# Patient Record
Sex: MALE | Race: WHITE | Employment: FULL TIME | URBAN - METROPOLITAN AREA
[De-identification: names, ages, dates, MRNs, and addresses within clinical notes are randomized per-mention and may not be internally consistent; named-entity substitution may affect disease eponyms.]

---

## 2017-08-24 ENCOUNTER — TRANSFERRED RECORDS (OUTPATIENT)
Dept: HEALTH INFORMATION MANAGEMENT | Facility: CLINIC | Age: 47
End: 2017-08-24

## 2018-09-11 ENCOUNTER — OFFICE VISIT (OUTPATIENT)
Dept: URGENT CARE | Facility: URGENT CARE | Age: 48
End: 2018-09-11
Payer: COMMERCIAL

## 2018-09-11 VITALS
RESPIRATION RATE: 16 BRPM | HEIGHT: 69 IN | OXYGEN SATURATION: 98 % | SYSTOLIC BLOOD PRESSURE: 118 MMHG | WEIGHT: 194 LBS | DIASTOLIC BLOOD PRESSURE: 68 MMHG | BODY MASS INDEX: 28.73 KG/M2 | TEMPERATURE: 98.5 F | HEART RATE: 64 BPM

## 2018-09-11 DIAGNOSIS — K64.4 EXTERNAL HEMORRHOIDS: Primary | ICD-10-CM

## 2018-09-11 PROCEDURE — 99203 OFFICE O/P NEW LOW 30 MIN: CPT | Performed by: PHYSICIAN ASSISTANT

## 2018-09-11 ASSESSMENT — ENCOUNTER SYMPTOMS
FEVER: 0
DIARRHEA: 0
CONSTIPATION: 0
NAUSEA: 0
COUGH: 0
VOMITING: 0
CHILLS: 0

## 2018-09-11 NOTE — PROGRESS NOTES
"SUBJECTIVE:   Justice Person is a 47 year old male presenting with a chief complaint of   Chief Complaint   Patient presents with     Pain     in anal area, bump x 2-3 days, some pain, applied bacitracin       He is a new patient of [facility].    He is presenting to urgent care with a complaint of a lump he has noticed in the anal area 3 days ago. Slightly tender and uncomfortable. He applied some bacitracin over the area without any changes. He has never had anything like this before. Denies any history of constipation, or straining with bowel movement. But notes he lifts heavy weights. He denies any abdominal pain, fever, chills, nausea, vomiting, or diarrhea.      Review of Systems   Constitutional: Negative for chills and fever.   Respiratory: Negative for cough.    Gastrointestinal: Negative for constipation, diarrhea, nausea and vomiting.   Genitourinary:        Lump in anal area       History reviewed. No pertinent past medical history.  Family History   Problem Relation Age of Onset     Diabetes Maternal Grandmother      Cancer - colorectal No family hx of      Prostate Cancer No family hx of      Current Outpatient Prescriptions   Medication Sig Dispense Refill     ALLEGRA 180 MG PO TABS 1 tablet qd  MD appointment needed 90 Tab 0     hydrocortisone (ANUSOL-HC) 2.5 % cream Place rectally 3 times daily for 7 days 30 g 1     NASONEX 50 MCG/ACT NA SUSP 2 sprays each nostril Once daily for allergies 3 months 1     Social History   Substance Use Topics     Smoking status: Never Smoker     Smokeless tobacco: Never Used     Alcohol use No       OBJECTIVE  /68 (BP Location: Right arm, Patient Position: Chair, Cuff Size: Adult Regular)  Pulse 64  Temp 98.5  F (36.9  C) (Oral)  Resp 16  Ht 5' 9.25\" (1.759 m)  Wt 194 lb (88 kg)  SpO2 98%  BMI 28.44 kg/m2    Physical Exam   Constitutional: He appears well-developed and well-nourished. No distress.   HENT:   Head: Normocephalic and atraumatic.   Right Ear: " External ear normal.   Left Ear: External ear normal.   Eyes: Conjunctivae are normal.   Neck: Normal range of motion.   Pulmonary/Chest: Effort normal. No respiratory distress.   Genitourinary:   Genitourinary Comments: External hemorrhoid noted at 6'oclock. Mild tenderness to palpation. No erythema, no open wound. Normal rectal tone. Normal external genitalia.   Neurological: He is alert.   Skin: Skin is warm.   Psychiatric: He has a normal mood and affect.       Labs:  No results found for this or any previous visit (from the past 24 hour(s)).        ASSESSMENT:      ICD-10-CM    1. External hemorrhoids K64.4 hydrocortisone (ANUSOL-HC) 2.5 % cream        Medical Decision Making:    Differential Diagnosis:  Hemorrhoid, abscess, etc....    Serious Comorbid Conditions:  Adult:  None    PLAN:  External hemorrhoid: Anusol Rx. Avoid heavy weight lifting until symptoms improve. Patient educational information provided on hemorrhoids. Follow up if any worsening symptoms. He agrees.    Followup:    If not improving or if condition worsens, follow up with your Primary Care Provider    Patient Instructions     Hemorrhoids    Hemorrhoids are swollen and inflamed veins inside the rectum and near the anus. The rectum is the last several inches of the colon. The anus is the passage between the rectum and the outside of the body.  Causes  The veins can become swollen due to increased pressure in them. This is most often caused by:    Chronic constipation or diarrhea    Straining when having a bowel movement    Sitting too long on the toilet    A low-fiber diet    Pregnancy  Symptoms    Bleeding from the rectum (this may be noticeable after bowel movements)    Lump near the anus    Itching around the anus    Pain around the anus  There are different types of hemorrhoids. Depending on the type you have and the severity, you may be able to treat yourself at home. In some cases, a procedure may be the best treatment option. Your  healthcare provider can tell you more about this, if needed.  Home care  General care    To get relief from pain or itching, try:  ? Medicines. Your healthcare provider may recommend stool softeners, suppositories, or laxatives to help manage constipation. Use these exactly as directed.  ? Sitz baths. A sitz bath involves sitting in a few inches of warm bath water. Be careful not to make the water so hot that you burn yourself--test it before sitting in it. Soak for about 10 to 15 minutes a few times a day. This may help relieve pain.  ? Topical products. Your healthcare provider may prescribe or recommend creams, ointments, or pads that can be applied to the hemorrhoid. Use these exactly as directed.  Tips to help prevent hemorrhoids    Eat more fiber. Fiber adds bulk to stool and absorbs water as it moves through your colon. This makes stool softer and easier to pass.  ? Increase the fiber in your diet with more fiber-rich foods. These include fresh fruit, vegetables, and whole grains.  ? Take a fiber supplement or bulking agent, if advised by your healthcare provider. These include products such as psyllium or methylcellulose.    Drink more water. Your healthcare provider may direct you to drink plenty of water. This can help keep stool soft.    Be more active. Frequent exercise aids digestion and helps prevent constipation. It may also help make bowel movements more regular.    Don t strain during bowel movements. This can make hemorrhoids more likely. Also, don t sit on the toilet for long periods of time.  Follow-up care  Follow up with your healthcare provider as advised. If a culture or imaging tests were done, someone will let you know the results when they are ready. This may take a few days or longer. If your healthcare provider recommends a procedure for your hemorrhoids, these options can be discussed. Options may include surgery and outpatient office treatments.  When to seek medical advice  Call your  healthcare provider right away if any of these occur:    Increased bleeding from the rectum    Increased pain around the rectum or anus    Weakness or dizziness  Call 911  Call 911 if any of these occur:    Trouble breathing or swallowing    Fainting or loss of consciousness    Unusually fast heart rate    Vomiting blood    Large amounts of blood in stool or black, tarry stools  Date Last Reviewed: 9/1/2017 2000-2017 [employer]. [address] [place] [address]. All rights reserved. This information is not intended as a substitute for professional medical care. Always follow your healthcare professional's instructions.        Hemorrhoids    Hemorrhoids are swollen and inflamed veins inside the rectum and near the anus. The rectum is the last several inches of the colon. The anus is the passage between the rectum and the outside of the body.  Causes  The veins can become swollen due to increased pressure in them. This is most often caused by:    Chronic constipation or diarrhea    Straining when having a bowel movement    Sitting too long on the toilet    A low-fiber diet    Pregnancy  Symptoms    Bleeding from the rectum (this may be noticeable after bowel movements)    Lump near the anus    Itching around the anus    Pain around the anus  There are different types of hemorrhoids. Depending on the type you have and the severity, you may be able to treat yourself at home. In some cases, a procedure may be the best treatment option. Your healthcare provider can tell you more about this, if needed.  Home care  General care    To get relief from pain or itching, try:  ? Medicines. Your healthcare provider may recommend stool softeners, suppositories, or laxatives to help manage constipation. Use these exactly as directed.  ? Sitz baths. A sitz bath involves sitting in a few inches of warm bath water. Be careful not to make the water so hot that you burn yourself--test it before sitting in it.  Soak for about 10 to 15 minutes a few times a day. This may help relieve pain.  ? Topical products. Your healthcare provider may prescribe or recommend creams, ointments, or pads that can be applied to the hemorrhoid. Use these exactly as directed.  Tips to help prevent hemorrhoids    Eat more fiber. Fiber adds bulk to stool and absorbs water as it moves through your colon. This makes stool softer and easier to pass.  ? Increase the fiber in your diet with more fiber-rich foods. These include fresh fruit, vegetables, and whole grains.  ? Take a fiber supplement or bulking agent, if advised by your healthcare provider. These include products such as psyllium or methylcellulose.    Drink more water. Your healthcare provider may direct you to drink plenty of water. This can help keep stool soft.    Be more active. Frequent exercise aids digestion and helps prevent constipation. It may also help make bowel movements more regular.    Don t strain during bowel movements. This can make hemorrhoids more likely. Also, don t sit on the toilet for long periods of time.  Follow-up care  Follow up with your healthcare provider as advised. If a culture or imaging tests were done, someone will let you know the results when they are ready. This may take a few days or longer. If your healthcare provider recommends a procedure for your hemorrhoids, these options can be discussed. Options may include surgery and outpatient office treatments.  When to seek medical advice  Call your healthcare provider right away if any of these occur:    Increased bleeding from the rectum    Increased pain around the rectum or anus    Weakness or dizziness  Call 911  Call 911 if any of these occur:    Trouble breathing or swallowing    Fainting or loss of consciousness    Unusually fast heart rate    Vomiting blood    Large amounts of blood in stool or black, tarry stools  Date Last Reviewed: 9/1/2017 2000-2017 [employer] [address]  [place] [address]. All rights reserved. This information is not intended as a substitute for professional medical care. Always follow your healthcare professional's instructions.

## 2018-09-11 NOTE — PATIENT INSTRUCTIONS
Hemorrhoids    Hemorrhoids are swollen and inflamed veins inside the rectum and near the anus. The rectum is the last several inches of the colon. The anus is the passage between the rectum and the outside of the body.  Causes  The veins can become swollen due to increased pressure in them. This is most often caused by:    Chronic constipation or diarrhea    Straining when having a bowel movement    Sitting too long on the toilet    A low-fiber diet    Pregnancy  Symptoms    Bleeding from the rectum (this may be noticeable after bowel movements)    Lump near the anus    Itching around the anus    Pain around the anus  There are different types of hemorrhoids. Depending on the type you have and the severity, you may be able to treat yourself at home. In some cases, a procedure may be the best treatment option. Your healthcare provider can tell you more about this, if needed.  Home care  General care    To get relief from pain or itching, try:  ? Medicines. Your healthcare provider may recommend stool softeners, suppositories, or laxatives to help manage constipation. Use these exactly as directed.  ? Sitz baths. A sitz bath involves sitting in a few inches of warm bath water. Be careful not to make the water so hot that you burn yourself--test it before sitting in it. Soak for about 10 to 15 minutes a few times a day. This may help relieve pain.  ? Topical products. Your healthcare provider may prescribe or recommend creams, ointments, or pads that can be applied to the hemorrhoid. Use these exactly as directed.  Tips to help prevent hemorrhoids    Eat more fiber. Fiber adds bulk to stool and absorbs water as it moves through your colon. This makes stool softer and easier to pass.  ? Increase the fiber in your diet with more fiber-rich foods. These include fresh fruit, vegetables, and whole grains.  ? Take a fiber supplement or bulking agent, if advised by your healthcare provider. These include products such as  psyllium or methylcellulose.    Drink more water. Your healthcare provider may direct you to drink plenty of water. This can help keep stool soft.    Be more active. Frequent exercise aids digestion and helps prevent constipation. It may also help make bowel movements more regular.    Don t strain during bowel movements. This can make hemorrhoids more likely. Also, don t sit on the toilet for long periods of time.  Follow-up care  Follow up with your healthcare provider as advised. If a culture or imaging tests were done, someone will let you know the results when they are ready. This may take a few days or longer. If your healthcare provider recommends a procedure for your hemorrhoids, these options can be discussed. Options may include surgery and outpatient office treatments.  When to seek medical advice  Call your healthcare provider right away if any of these occur:    Increased bleeding from the rectum    Increased pain around the rectum or anus    Weakness or dizziness  Call 911  Call 911 if any of these occur:    Trouble breathing or swallowing    Fainting or loss of consciousness    Unusually fast heart rate    Vomiting blood    Large amounts of blood in stool or black, tarry stools  Date Last Reviewed: 9/1/2017 2000-2017 "[employer]. [address] [place] [address]. All rights reserved. This information is not intended as a substitute for professional medical care. Always follow your healthcare professional's instructions.        Hemorrhoids    Hemorrhoids are swollen and inflamed veins inside the rectum and near the anus. The rectum is the last several inches of the colon. The anus is the passage between the rectum and the outside of the body.  Causes  The veins can become swollen due to increased pressure in them. This is most often caused by:    Chronic constipation or diarrhea    Straining when having a bowel movement    Sitting too long on the toilet    A low-fiber  diet    Pregnancy  Symptoms    Bleeding from the rectum (this may be noticeable after bowel movements)    Lump near the anus    Itching around the anus    Pain around the anus  There are different types of hemorrhoids. Depending on the type you have and the severity, you may be able to treat yourself at home. In some cases, a procedure may be the best treatment option. Your healthcare provider can tell you more about this, if needed.  Home care  General care    To get relief from pain or itching, try:  ? Medicines. Your healthcare provider may recommend stool softeners, suppositories, or laxatives to help manage constipation. Use these exactly as directed.  ? Sitz baths. A sitz bath involves sitting in a few inches of warm bath water. Be careful not to make the water so hot that you burn yourself--test it before sitting in it. Soak for about 10 to 15 minutes a few times a day. This may help relieve pain.  ? Topical products. Your healthcare provider may prescribe or recommend creams, ointments, or pads that can be applied to the hemorrhoid. Use these exactly as directed.  Tips to help prevent hemorrhoids    Eat more fiber. Fiber adds bulk to stool and absorbs water as it moves through your colon. This makes stool softer and easier to pass.  ? Increase the fiber in your diet with more fiber-rich foods. These include fresh fruit, vegetables, and whole grains.  ? Take a fiber supplement or bulking agent, if advised by your healthcare provider. These include products such as psyllium or methylcellulose.    Drink more water. Your healthcare provider may direct you to drink plenty of water. This can help keep stool soft.    Be more active. Frequent exercise aids digestion and helps prevent constipation. It may also help make bowel movements more regular.    Don t strain during bowel movements. This can make hemorrhoids more likely. Also, don t sit on the toilet for long periods of time.  Follow-up care  Follow up with  your healthcare provider as advised. If a culture or imaging tests were done, someone will let you know the results when they are ready. This may take a few days or longer. If your healthcare provider recommends a procedure for your hemorrhoids, these options can be discussed. Options may include surgery and outpatient office treatments.  When to seek medical advice  Call your healthcare provider right away if any of these occur:    Increased bleeding from the rectum    Increased pain around the rectum or anus    Weakness or dizziness  Call 911  Call 911 if any of these occur:    Trouble breathing or swallowing    Fainting or loss of consciousness    Unusually fast heart rate    Vomiting blood    Large amounts of blood in stool or black, tarry stools  Date Last Reviewed: 9/1/2017 2000-2017 [employer]. [address] [place] [address], [place] [address]. All rights reserved. This information is not intended as a substitute for professional medical care. Always follow your healthcare professional's instructions.

## 2018-09-11 NOTE — MR AVS SNAPSHOT
"              After Visit Summary   9/11/2018    Justice Person    MRN: [id number]           Patient Information     Date Of Birth          1970        Visit Information        Provider Department      9/11/2018 3:05 PM Petra Jane PA-C [facility]        Today's Diagnoses     External hemorrhoids    -  1       Follow-ups after your visit        Who to contact     If you have questions or need follow up information about today's clinic visit or your schedule please contact [facility] directly at [phone].  Normal or non-critical lab and imaging results will be communicated to you by MyChart, letter or phone within 4 business days after the clinic has received the results. If you do not hear from us within 7 days, please contact the clinic through MyChart or phone. If you have a critical or abnormal lab result, we will notify you by phone as soon as possible.  Submit refill requests through [employer]) or call your pharmacy and they will forward the refill request to us. Please allow 3 business days for your refill to be completed.          Additional Information About Your Visit        Care EveryWhere ID     This is your Care EveryWhere ID. This could be used by other organizations to access your [facility] medical records  [id number]        Your Vitals Were     Pulse Temperature Respirations Height Pulse Oximetry BMI (Body Mass Index)    64 98.5  F (36.9  C) (Oral) 16 5' 9.25\" (1.759 m) 98% 28.44 kg/m2       Blood Pressure from Last 3 Encounters:   09/11/18 118/68   02/19/09 122/74   04/01/08 114/66    Weight from Last 3 Encounters:   09/11/18 194 lb (88 kg)   02/19/09 203 lb 12.8 oz (92.4 kg)   04/01/08 199 lb (90.3 kg)              Today, you had the following     No orders found for display         Today's Medication Changes          These changes are accurate as of 9/11/18  3:36 PM.  If you have any questions, ask your nurse or doctor.               Start " taking these medicines.        Dose/Directions    hydrocortisone 2.5 % cream   Commonly known as:  ANUSOL-HC   Used for:  External hemorrhoids   Started by:  Petra Jane PA-C        Place rectally 3 times daily for 7 days   Quantity:  30 g   Refills:  1            Where to get your medicines      These medications were sent to [facility] [address]  [place], [place] - [address]  [address], [place] [address]     Phone:  [phone]     hydrocortisone 2.5 % cream                Primary Care Provider    Provider Not In System                Equal Access to Services     [facility]: Hadii miki langford hadasho Soomaali, waaxda luqadaha, qaybta kaalmada ju, noe sweet . So [facility] [phone].    ATENCIÓN: Si habla español, tiene a orellana disposición servicios gratuitos de asistencia lingüística. [facility] [phone].    We comply with applicable federal civil rights laws and [place] laws. We do not discriminate on the basis of race, color, national origin, age, disability, sex, sexual orientation, or gender identity.            Thank you!     Thank you for choosing [facility]  for your care. Our goal is always to provide you with excellent care. Hearing back from our patients is one way we can continue to improve our services. Please take a few minutes to complete the written survey that you may receive in the mail after your visit with us. Thank you!             Your Updated Medication List - Protect others around you: Learn how to safely use, store and throw away your medicines at www.disposemymeds.org.          This list is accurate as of 9/11/18  3:36 PM.  Always use your most recent med list.                   Brand Name Dispense Instructions for use Diagnosis    ALLEGRA 180 MG tablet   Generic drug:  fexofenadine     90 Tab    1 tablet qd  MD appointment needed    Hay fever       hydrocortisone 2.5 % cream    ANUSOL-HC    30 g    Place  rectally 3 times daily for 7 days    External hemorrhoids       NASONEX 50 MCG/ACT spray   Generic drug:  mometasone     3 months    2 sprays each nostril Once daily for allergies    Hay fever

## 2018-09-17 ENCOUNTER — OFFICE VISIT (OUTPATIENT)
Dept: URGENT CARE | Facility: URGENT CARE | Age: 48
End: 2018-09-17
Payer: COMMERCIAL

## 2018-09-17 ENCOUNTER — HOSPITAL ENCOUNTER (EMERGENCY)
Facility: CLINIC | Age: 48
Discharge: HOME | End: 2018-09-17
Attending: NURSE PRACTITIONER | Admitting: NURSE PRACTITIONER
Payer: COMMERCIAL

## 2018-09-17 VITALS
DIASTOLIC BLOOD PRESSURE: 82 MMHG | TEMPERATURE: 99.1 F | SYSTOLIC BLOOD PRESSURE: 120 MMHG | HEART RATE: 75 BPM | OXYGEN SATURATION: 98 %

## 2018-09-17 VITALS
TEMPERATURE: 98.6 F | RESPIRATION RATE: 16 BRPM | DIASTOLIC BLOOD PRESSURE: 94 MMHG | OXYGEN SATURATION: 99 % | BODY MASS INDEX: 28.44 KG/M2 | HEART RATE: 80 BPM | WEIGHT: 194 LBS | SYSTOLIC BLOOD PRESSURE: 163 MMHG

## 2018-09-17 DIAGNOSIS — K65.1 ABSCESS OF MALE PELVIS (H): Primary | ICD-10-CM

## 2018-09-17 DIAGNOSIS — K60.30 ANAL FISTULA: ICD-10-CM

## 2018-09-17 DIAGNOSIS — L02.214 ABSCESS OF GROIN, LEFT: ICD-10-CM

## 2018-09-17 DIAGNOSIS — L73.9 FOLLICULITIS: ICD-10-CM

## 2018-09-17 DIAGNOSIS — L03.314 CELLULITIS OF GROIN, LEFT: ICD-10-CM

## 2018-09-17 PROCEDURE — 10060 I&D ABSCESS SIMPLE/SINGLE: CPT

## 2018-09-17 PROCEDURE — 99283 EMERGENCY DEPT VISIT LOW MDM: CPT | Mod: 25

## 2018-09-17 PROCEDURE — 99214 OFFICE O/P EST MOD 30 MIN: CPT | Performed by: FAMILY MEDICINE

## 2018-09-17 RX ORDER — LIDOCAINE HYDROCHLORIDE 10 MG/ML
INJECTION, SOLUTION INFILTRATION; PERINEURAL
Status: DISCONTINUED
Start: 2018-09-17 | End: 2018-09-17 | Stop reason: HOSPADM

## 2018-09-17 ASSESSMENT — ENCOUNTER SYMPTOMS
CHILLS: 0
NAUSEA: 0
FEVER: 0
VOMITING: 0

## 2018-09-17 NOTE — MR AVS SNAPSHOT
After Visit Summary   9/17/2018    Justice Person    MRN: [id number]           Patient Information     Date Of Birth          1970        Visit Information        Provider Department      9/17/2018 3:40 PM Cat Rob MD [facility]        Today's Diagnoses     Abscess of male pelvis (H)    -  1       Follow-ups after your visit        Who to contact     If you have questions or need follow up information about today's clinic visit or your schedule please contact [facility] directly at [phone].  Normal or non-critical lab and imaging results will be communicated to you by MyChart, letter or phone within 4 business days after the clinic has received the results. If you do not hear from us within 7 days, please contact the clinic through MyChart or phone. If you have a critical or abnormal lab result, we will notify you by phone as soon as possible.  Submit refill requests through [employer] or call your pharmacy and they will forward the refill request to us. Please allow 3 business days for your refill to be completed.          Additional Information About Your Visit        Care EveryWhere ID     This is your Care EveryWhere ID. This could be used by other organizations to access your [place] medical records  [id number]        Your Vitals Were     Pulse Temperature Pulse Oximetry             75 99.1  F (37.3  C) (Oral) 98%          Blood Pressure from Last 3 Encounters:   09/17/18 120/82   09/11/18 118/68   02/19/09 122/74    Weight from Last 3 Encounters:   09/11/18 194 lb (88 kg)   02/19/09 203 lb 12.8 oz (92.4 kg)   04/01/08 199 lb (90.3 kg)              Today, you had the following     No orders found for display       Primary Care Provider Fax #    Physician No Ref-Primary [phone]       No address on file        Equal Access to Services     ISELA WATERS : naveed Hernandez qaybta kaalmada adeegyada, waxay  juvencio hernandezjustin joyaan ah. So [facility] [phone].    ATENCIÓN: Si habla cholo, tiene a orellana disposición servicios gratuitos de asistencia lingüística. Jack al [phone].    We comply with applicable federal civil rights laws and [place] laws. We do not discriminate on the basis of race, color, national origin, age, disability, sex, sexual orientation, or gender identity.            Thank you!     Thank you for choosing [facility]  for your care. Our goal is always to provide you with excellent care. Hearing back from our patients is one way we can continue to improve our services. Please take a few minutes to complete the written survey that you may receive in the mail after your visit with us. Thank you!             Your Updated Medication List - Protect others around you: Learn how to safely use, store and throw away your medicines at www.disposemymeds.org.          This list is accurate as of 9/17/18  4:39 PM.  Always use your most recent med list.                   Brand Name Dispense Instructions for use Diagnosis    ALLEGRA 180 MG tablet   Generic drug:  fexofenadine     90 Tab    1 tablet qd  MD appointment needed    Hay fever       hydrocortisone 2.5 % cream    ANUSOL-HC    30 g    Place rectally 3 times daily for 7 days    External hemorrhoids       NASONEX 50 MCG/ACT spray   Generic drug:  mometasone     3 months    2 sprays each nostril Once daily for allergies    Hay fever

## 2018-09-17 NOTE — ED AVS SNAPSHOT
[facility] Emergency Department    [address]    [facility] [address]    Phone:  [phone]    Fax:  [phone]                                       Justice Person   MRN: [id number]    Department:  [facility] Emergency Department   Date of Visit:  9/17/2018           Patient Information     Date Of Birth          1970        Your diagnoses for this visit were:     Folliculitis     Abscess of groin, left     Cellulitis of groin, left     Anal fistula        You were seen by Reyna Alba APRN CNP and Geoff Jacobs MD.      Follow-up Information     Schedule an appointment as soon as possible for a visit with COLON & RECTAL SURGERY [facility].    Specialty:  Colon and Rectal Surgery    Contact information:    [address] [place]  [facility] [address] [phone]        Follow up with Your primary care physcian In 2 days.    Why:  For wound re-check        Follow up with [facility] Emergency Department.    Specialty:  EMERGENCY MEDICINE    Why:  As needed, If symptoms worsen    Contact information:    [address]  [facility] [address]  [phone]        Discharge Instructions         Call tomorrow to schedule a appointment with a colorectal surgeon.  Follow-up with your primary in 2 days to recheck your abscess and cellulitis.  Return to ED with increasing pain, increasing redness, fevers, vomiting or inability to tolerate antibiotics.          Discharge Instructions  Cellulitis    Cellulitis is an infection of the skin that occurs when bacteria enter the skin.   Symptoms are generally redness, swelling, warmth and pain.  Your infection appeared to be appropriate to treat at home with antibiotics.  However, sometimes your infection may be worse than it seemed at first, or may worsen with time. If you have new or worse symptoms, you may need to be seen again in the Emergency Department or by your  primary provider.    Generally, every Emergency Department visit should have a follow-up clinic visit with either a primary or a specialty clinic/provider. Please follow-up as instructed by your emergency provider today.    Return to the Emergency Department if:    The redness, pain, or swelling gets a lot worse.  If the red area was marked, return if it is red significantly beyond the marked area.    You are unable to get your antibiotics, or are vomiting (throwing up) these pills, or you cannot take them.    You are feeling more ill, weak or lightheaded.    You start to run a new fever (temperature >101 F).    Anything else about the infection worries or concerns you.  Treatment:    Start your antibiotics right away, and take them as prescribed. Be sure to finish the whole prescription, even if you are better.    Apply a heating pad, warm packs, or warm water soaks to the infected area for 15 minutes at a time, at least 3 times a day. Do not use a heating pad on your feet or legs if you have diabetes. Do not sleep with a heating pad on, since this can cause burns or skin injury.    Rest your injured area for at least 1-2 days. After that you may start using your extremity again as long as there is not too much pain.     Raise the injured area above the level of your heart as much as possible in the first 1-2 days.    Tylenol  (acetaminophen), Motrin  (ibuprofen), or Advil  (ibuprofen) may help may help reduce pain and fever and may help you feel more comfortable. Be sure to read and follow the package directions, and ask your provider if you have questions.    If you were given a prescription for medicine here today, be sure to read all of the information (including the package insert) that comes with your prescription.  This will include important information about the medicine, its side effects, and any warnings that you need to know about.  The pharmacist who fills the prescription can provide more information  and answer questions you may have about the medicine.  If you have questions or concerns that the pharmacist cannot address, please call or return to the Emergency Department.     Remember that you can always come back to the Emergency Department if you are not able to see your regular provider in the amount of time listed above, if you get any new symptoms, or if there is anything that worries you.    .    Folliculitis  Folliculitis is an inflammation of a hair follicle. A hair follicle is the little pocket where a hair grows out of the skin. Bacteria normally live on the skin. But sometimes bacteria can get trapped in a follicle and cause infection. This causes a bumpy rash. The area over the follicles is red and raised. It may itch or be painful. The bumps may have fluid (pus) inside. The pus may leak and then form crusts. Sores can spread to other areas of the body. Once it goes away, folliculitis can come back at any time. Severe cases may cause permanent hair loss and scarring.  Folliculitis can happen anywhere on the body where hair grows. It can be caused by rubbing from tight clothing. Ingrown hairs can cause it. Soaking in a hot tub or swimming pool that has bacteria in the water can cause it. It may also occur if a hair follicle is blocked by a bandage.  Sores often go away in a few days with no treatment. In some cases, medicine may be given. A small piece of skin or pus may be taken to find the type of bacteria causing the infection.  Home care  The healthcare provider may prescribe an antibiotic cream or ointment.  Oral antibiotics may also be prescribed. Or you may be told to use an over-the-counter antibiotic cream. Follow all instructions when using any of these medicines.  General care:    Apply warm, moist compresses to the sores for 20 minutes up to 3 times a day. You can make a compress by soaking a cloth in warm water. Squeeze out excess water.    Don t cut, poke, or squeeze the sores. This can  be painful and spread infection.    Don t scratch the affected area. Scratching can delay healing.    Don t shave the areas affected by folliculitis.    If the sores leak fluid, cover the area with a nonstick gauze bandage. Use as little tape as possible. Carefully discard all soiled bandages.    Dress in loose cotton clothing.    Change sheets and blankets if they are soiled by pus. Wash all clothes, towels, sheets, and cloth diapers in soap and hot water. Do not share clothes, towels, or sheets with other family members.    Do not soak the sores in bath water. This can spread infection. Instead, keep the area clean by gently washing sores with soap and warm water.    Wash your hands or use antibacterial gels often to prevent spreading the bacteria.  Follow-up care  Follow up with your healthcare provider, or as advised.  When to seek medical advice  Call your healthcare provider right away if any of these occur:    Fever of 100.4 F (38 C) or higher    Spreading of the rash    Rash does not get better with treatment    Redness or swelling that gets worse    Rash becomes more painful    Foul-smelling fluid leaking from the skin    Rash improves, but then comes back   Date Last Reviewed: 11/1/2016 2000-2017 [employer]. [address] [place] [address], [place] [address]. All rights reserved. This information is not intended as a substitute for professional medical care. Always follow your healthcare professional's instructions.          Abscess (Incision & Drainage)  An abscess is sometimes called a boil. It happens when bacteria get trapped under the skin and start to grow. Pus forms inside the abscess as the body responds to the bacteria. An abscess can happen with an insect bite, ingrown hair, blocked oil gland, pimple, cyst, or puncture wound.  Your healthcare provider has drained the pus from your abscess. If the abscess pocket was large, your healthcare provider may have put in gauze packing. Your  provider will need to remove it on your next visit. He or she may also replace it at that time. You may not need antibiotics to treat a simple abscess, unless the infection is spreading into the skin around the wound (cellulitis).  The wound will take about 1 to 2 weeks to heal, depending on the size of the abscess. Healthy tissue will grow from the bottom and sides of the opening until it seals over.  Home care  These tips can help your wound heal:    The wound may drain for the first 2 days. Cover the wound with a clean dry dressing. Change the dressing if it becomes soaked with blood or pus.    If a gauze packing was placed inside the abscess pocket, you may be told to remove it yourself. You may do this in the shower. Once the packing is removed, you should wash the area in the shower, or clean the area as directed by your provider. Continue to do this until the skin opening has closed. Make sure you wash your hands after changing the packing or cleaning the wound.    If you were prescribed antibiotics, take them as directed until they are all gone.    You may use acetaminophen or ibuprofen to control pain, unless another pain medicine was prescribed. If you have liver disease or ever had a stomach ulcer, talk with your doctor before using these medicines.  Follow-up care  Follow up with your healthcare provider, or as advised. If a gauze packing was put in your wound, it should be removed in 1 to 2 days. Check your wound every day for any signs that the infection is getting worse. The signs are listed below.  When to seek medical advice  Call your healthcare provider right away if any of these occur:    Increasing redness or swelling    Red streaks in the skin leading away from the wound    Increasing local pain or swelling    Continued pus draining from the wound 2 days after treatment    Fever of 100.4 F (38 C) or higher, or as directed by your healthcare provider    Boil returns when you are at home  Date  Last Reviewed: 9/1/2016 2000-2017 [employer]. [address] [place] [address], [place] [address]. All rights reserved. This information is not intended as a substitute for professional medical care. Always follow your healthcare professional's instructions.          Anal Fistula  The anal canal is the end portion of the intestinal tract. It includes the rectum and anus. Sometimes, an abnormal passage forms from the anal canal to the skin near the anus. This is called an anal fistula. Anal fistulas can also form from the anal canal to other organs, such as the vagina or urinary tract.  An anal fistula most often occurs due to an anal abscess or infection. It can also occur with certain conditions, such as Crohn s disease. Trauma to the anal canal and surgery can also lead to anal fistulas.  Symptoms of an anal fistula can include:    Pain in or near the rectum    Drainage, which may contain blood, pus, or both (the drainage may be constant or stop and start again)    Bleeding from the rectum    Urinary problems  If you have an anal abscess or infection along with a fistula, you may also notice redness, swelling, or soreness in or near the anus or rectum. You may have a fever as well.  If caused by Crohn s disease, an anal fistula may respond to medicines such as antibiotics and immunosuppressants. This may lead to complete closure of the fistula. But once treatment stops, there is a high chance that the fistula may form again.  Anal fistulas often require surgery if other treatments don t correct the problem. The type of surgery depends on the type of fistula. More than one surgery may be required.  Please discuss all forms of treatment with your healthcare provider.  Home care  As you recover from treatment, make sure to take any prescribed medicines as directed. Do not take any over-the-counter medicines without first talking to your healthcare provider.  You may also be advised to:    Soak in a warm bath  3 or 4 times a day.    Wear a pad over your anal area as directed.    Eat a diet high in fiber.    Drink plenty of fluids.    Use a stool softener or bulk laxative as needed.    Return to your normal routine only after being cleared by your healthcare provider.  Follow-up care  Follow up with your healthcare provider, or as advised.  When to seek medical advice  Call your healthcare provider right away if any of these occur:    Fever of 100.4 F (38 C) or higher, or as directed by your healthcare provider    Hard or painful stools or trouble controlling your bowel movements    Symptoms of anal fistula return    Increased pain, redness, swelling, or drainage in or near the anus or rectum    Pain in the belly that does not respond to treatment or that does not go away after a few hours    Swelling in the belly that does not go away after a few hours    Mucus, pus or blood in the stool (dark or bright red)    Vomiting that won t stop  Call 911  Call 911 if any of these occur:    Trouble breathing or swallowing    Fainting    Rapid heart rate    Large amounts of blood in stool  Resources  The resources below can help you learn more about anal fistulas. They may also help you find support if you have conditions such as Crohn s disease.    National [place] of Diabetes and Digestive and Kidney Diseases (NIDDK), www.niddk.nih.gov    Crohn s and Colitis Foundation of [place], www.ccfa.org  Date Last Reviewed: 6/22/2015 2000-2017 [employer]. [address] [place] [address]. All rights reserved. This information is not intended as a substitute for professional medical care. Always follow your healthcare professional's instructions.          Discharge References/Attachments     ANAL FISTULA (ENGLISH)      24 Hour Appointment Hotline       To make an appointment at any [facility], call [phone] ([phone]). If you don't have a family doctor or clinic, we will help you find one.  [facility] are conveniently located to serve the needs of you and your family.             Review of your medicines      START taking        Dose / Directions Last dose taken    amoxicillin-clavulanate 875-125 MG per tablet   Commonly known as:  AUGMENTIN   Dose:  1 tablet   Quantity:  14 tablet        Take 1 tablet by mouth 2 times daily for 7 days   Refills:  0          Our records show that you are taking the medicines listed below. If these are incorrect, please call your family doctor or clinic.        Dose / Directions Last dose taken    ALLEGRA 180 MG tablet   Quantity:  90 Tab   Generic drug:  fexofenadine        1 tablet qd  MD appointment needed   Refills:  0        hydrocortisone 2.5 % cream   Commonly known as:  ANUSOL-HC   Quantity:  30 g        Place rectally 3 times daily for 7 days   Refills:  1        NASONEX 50 MCG/ACT spray   Quantity:  3 months   Generic drug:  mometasone        2 sprays each nostril Once daily for allergies   Refills:  1                Prescriptions were sent or printed at these locations (1 Prescription)                   Other Prescriptions                Printed at Department/Unit printer (1 of 1)         amoxicillin-clavulanate (AUGMENTIN) 875-125 MG per tablet                Orders Needing Specimen Collection     None      Pending Results     No orders found from 9/15/2018 to 9/18/2018.            Pending Culture Results     No orders found from 9/15/2018 to 9/18/2018.            Pending Results Instructions     If you had any lab results that were not finalized at the time of your Discharge, you can call the ED Lab Result RN at [phone]. You will be contacted by this team for any positive Lab results or changes in treatment. The nurses are available 7 days a week from 10A to 6:30P.  You can leave a message 24 hours per day and they will return your call.        Test Results From Your Hospital Stay               Clinical Quality Measure: Blood Pressure Screening      Your blood pressure was checked while you were in the emergency department today. The last reading we obtained was  BP: (!) 163/94 . Please read the guidelines below about what these numbers mean and what you should do about them.  If your systolic blood pressure (the top number) is less than 120 and your diastolic blood pressure (the bottom number) is less than 80, then your blood pressure is normal. There is nothing more that you need to do about it.  If your systolic blood pressure (the top number) is 120-139 or your diastolic blood pressure (the bottom number) is 80-89, your blood pressure may be higher than it should be. You should have your blood pressure rechecked within a year by a primary care provider.  If your systolic blood pressure (the top number) is 140 or greater or your diastolic blood pressure (the bottom number) is 90 or greater, you may have high blood pressure. High blood pressure is treatable, but if left untreated over time it can put you at risk for heart attack, stroke, or kidney failure. You should have your blood pressure rechecked by a primary care provider within the next 4 weeks.  If your provider in the emergency department today gave you specific instructions to follow-up with your doctor or provider even sooner than that, you should follow that instruction and not wait for up to 4 weeks for your follow-up visit.        Thank you for choosing [facility]       Thank you for choosing [facility] for your care. Our goal is always to provide you with excellent care. Hearing back from our patients is one way we can continue to improve our services. Please take a few minutes to complete the written survey that you may receive in the mail after you visit with us. Thank you!        Care EveryWhere ID     This is your Care EveryWhere ID. This could be used by other organizations to access your [facility] medical records  [id number]        Equal Access to Services     ISELA WATERS AH: Kellen langford  beth Arvizu, naveed coleman, юлия dodd, noe cisneros. So [facility] [phone].    ATENCIÓN: Si habla español, tiene a orellana disposición servicios gratuitos de asistencia lingüística. Llame al [phone].    We comply with applicable federal civil rights laws and [place] laws. We do not discriminate on the basis of race, color, national origin, age, disability, sex, sexual orientation, or gender identity.            After Visit Summary       This is your record. Keep this with you and show to your community pharmacist(s) and doctor(s) at your next visit.

## 2018-09-17 NOTE — ED AVS SNAPSHOT
[facility] Emergency Department    [address]    [facility] [address]    Phone:  [phone]    Fax:  [phone]                                       Justice Person   MRN: [id number]    Department:  [facility] Emergency Department   Date of Visit:  9/17/2018           After Visit Summary Signature Page     I have received my discharge instructions, and my questions have been answered. I have discussed any challenges I see with this plan with the nurse or doctor.    ..........................................................................................................................................  Patient/Patient Representative Signature      ..........................................................................................................................................  Patient Representative Print Name and Relationship to Patient    ..................................................               ................................................  Date                                   Time    ..........................................................................................................................................  Reviewed by Signature/Title    ...................................................              ..............................................  Date                                               Time          22EPIC Rev 08/18

## 2018-09-17 NOTE — PROGRESS NOTES
SUBJECTIVE:  Chief Complaint   Patient presents with     Urgent Care     Pain     Lt groin dull pain. Had been seen at the clinic due to hemorroid, started having pain on the Lt groin area x2 days- constant dull ache     Justice Person is a 47 year old male  who presents with chief complaint of anal tenderness/pain, blood streaked stool and anal pain which began 3-4 days ago.  He complains of worsening of pain for 2 days with drainage by his anus which is moderate.    He thought he had hemorrhoids and has been applying a steroid cream, but symptoms have been worse,  Also the skin in the gluteal crease is irritated .  Pain improved with nothing.  He denies any abdominal pain, nausea or vomiting or fevers.  No history of melena, profuse bleeding.      2 days ago he started developing pain at the base of the penis with redness / swelling / some fluctuance, but no drainage      No past medical history on file.  Patient Active Problem List   Diagnosis     Upper back pain     CARDIOVASCULAR SCREENING; LDL GOAL LESS THAN 160       ALLERGIES:  No known drug allergies      Current Outpatient Prescriptions on File Prior to Visit:  ALLEGRA 180 MG PO TABS 1 tablet qd  MD appointment needed   hydrocortisone (ANUSOL-HC) 2.5 % cream Place rectally 3 times daily for 7 days   NASONEX 50 MCG/ACT NA SUSP 2 sprays each nostril Once daily for allergies     No current facility-administered medications on file prior to visit.     Social History   Substance Use Topics     Smoking status: Never Smoker     Smokeless tobacco: Never Used     Alcohol use No       Family History   Problem Relation Age of Onset     Diabetes Maternal Grandmother      Cancer - colorectal No family hx of      Prostate Cancer No family hx of          ROS:  CONSTITUTIONAL:NEGATIVE for fever, chills,    EYES: NEGATIVE for vision changes or irritation  ENT/MOUTH: NEGATIVE for ear, mouth and throat problems  RESP:NEGATIVE for significant cough or SOB  GI: NEGATIVE for  nausea, abdominal pain,     OBJECTIVE:  /82 (BP Location: Right arm, Patient Position: Chair, Cuff Size: Adult Large)  Pulse 75  Temp 99.1  F (37.3  C) (Oral)  SpO2 98%  Constitutional:  General appearance: mild distress.   .  Abdomen: normal bowel sound, soft,non-tender.    Rectal exam: no tenderness noted with palpation of the anus, non-inflamed  external hemorrhoids noted- not tender  About 2 cm below the anus in the gluteal crease he has a draining abcess-  A hole 6 x 6 cm with purulent drainage noted.   With pressure to the adjacent gluteus fluctuance is felt and pus is expressed from the abcess.    Groin--  At the base of the penis on the left side he has an area 4 x 4 cm swollen, erythematous, fluctuant           EYES: EOMI,   conjunctiva clear  HENT: External ears with no swelling or lesions   Nose and lips without  Swelling, ulcers, erythema or lesions  NECK: normal pain free ROM  RESP: no labored respirations, no tachypnea  EXTREMITIES:   Full ROM without expression of pain or limitation x 4 extremities  NEURO: Normal strength and tone, ambulation without difficulty,   normal speech and mentation  SKIN: no suspicious lesions or rashes  PSYCH: mentation and affect appears normal and patient appearance--appropriately groomed       ASSESSMENT:   Abscess of male pelvis (H)     Two abcesses are noted-  Anterior at the base of the penis  and inferior to the anus-  Concern is that he may have a more involved pelvic infection connecting the 2 abcesses-  Will go to [facility] for further imaging to evaluate the extension of these 2 pelvic abcesses    Called [facility] ER- accepted

## 2018-09-17 NOTE — ED PROVIDER NOTES
Emergency Department Attending Supervision Note  9/17/2018  5:49 PM      I evaluated this patient in conjunction with Reyna Alba PA-C      Briefly, the patient presented with abscess      On my exam, ***    Results:    ED course:    My impression is ***        Diagnosis  No diagnosis found.      Reyna Alba, APRBETTYE *           Scribe Disclosure:  I, Hema Chen, am serving as a scribe at 5:48 PM on 9/17/2018 to document services personally performed by Casie Ramirez MD based on my observations and the provider's statements to me.

## 2018-09-17 NOTE — DISCHARGE INSTRUCTIONS
Call tomorrow to schedule a appointment with a colorectal surgeon.  Follow-up with your primary in 2 days to recheck your abscess and cellulitis.  Return to ED with increasing pain, increasing redness, fevers, vomiting or inability to tolerate antibiotics.          Discharge Instructions  Cellulitis    Cellulitis is an infection of the skin that occurs when bacteria enter the skin.   Symptoms are generally redness, swelling, warmth and pain.  Your infection appeared to be appropriate to treat at home with antibiotics.  However, sometimes your infection may be worse than it seemed at first, or may worsen with time. If you have new or worse symptoms, you may need to be seen again in the Emergency Department or by your primary provider.    Generally, every Emergency Department visit should have a follow-up clinic visit with either a primary or a specialty clinic/provider. Please follow-up as instructed by your emergency provider today.    Return to the Emergency Department if:    The redness, pain, or swelling gets a lot worse.  If the red area was marked, return if it is red significantly beyond the marked area.    You are unable to get your antibiotics, or are vomiting (throwing up) these pills, or you cannot take them.    You are feeling more ill, weak or lightheaded.    You start to run a new fever (temperature >101 F).    Anything else about the infection worries or concerns you.  Treatment:    Start your antibiotics right away, and take them as prescribed. Be sure to finish the whole prescription, even if you are better.    Apply a heating pad, warm packs, or warm water soaks to the infected area for 15 minutes at a time, at least 3 times a day. Do not use a heating pad on your feet or legs if you have diabetes. Do not sleep with a heating pad on, since this can cause burns or skin injury.    Rest your injured area for at least 1-2 days. After that you may start using your extremity again as long as there is  not too much pain.     Raise the injured area above the level of your heart as much as possible in the first 1-2 days.    Tylenol  (acetaminophen), Motrin  (ibuprofen), or Advil  (ibuprofen) may help may help reduce pain and fever and may help you feel more comfortable. Be sure to read and follow the package directions, and ask your provider if you have questions.    If you were given a prescription for medicine here today, be sure to read all of the information (including the package insert) that comes with your prescription.  This will include important information about the medicine, its side effects, and any warnings that you need to know about.  The pharmacist who fills the prescription can provide more information and answer questions you may have about the medicine.  If you have questions or concerns that the pharmacist cannot address, please call or return to the Emergency Department.     Remember that you can always come back to the Emergency Department if you are not able to see your regular provider in the amount of time listed above, if you get any new symptoms, or if there is anything that worries you.    .    Folliculitis  Folliculitis is an inflammation of a hair follicle. A hair follicle is the little pocket where a hair grows out of the skin. Bacteria normally live on the skin. But sometimes bacteria can get trapped in a follicle and cause infection. This causes a bumpy rash. The area over the follicles is red and raised. It may itch or be painful. The bumps may have fluid (pus) inside. The pus may leak and then form crusts. Sores can spread to other areas of the body. Once it goes away, folliculitis can come back at any time. Severe cases may cause permanent hair loss and scarring.  Folliculitis can happen anywhere on the body where hair grows. It can be caused by rubbing from tight clothing. Ingrown hairs can cause it. Soaking in a hot tub or swimming pool that has bacteria in the water can cause  it. It may also occur if a hair follicle is blocked by a bandage.  Sores often go away in a few days with no treatment. In some cases, medicine may be given. A small piece of skin or pus may be taken to find the type of bacteria causing the infection.  Home care  The healthcare provider may prescribe an antibiotic cream or ointment.  Oral antibiotics may also be prescribed. Or you may be told to use an over-the-counter antibiotic cream. Follow all instructions when using any of these medicines.  General care:    Apply warm, moist compresses to the sores for 20 minutes up to 3 times a day. You can make a compress by soaking a cloth in warm water. Squeeze out excess water.    Don t cut, poke, or squeeze the sores. This can be painful and spread infection.    Don t scratch the affected area. Scratching can delay healing.    Don t shave the areas affected by folliculitis.    If the sores leak fluid, cover the area with a nonstick gauze bandage. Use as little tape as possible. Carefully discard all soiled bandages.    Dress in loose cotton clothing.    Change sheets and blankets if they are soiled by pus. Wash all clothes, towels, sheets, and cloth diapers in soap and hot water. Do not share clothes, towels, or sheets with other family members.    Do not soak the sores in bath water. This can spread infection. Instead, keep the area clean by gently washing sores with soap and warm water.    Wash your hands or use antibacterial gels often to prevent spreading the bacteria.  Follow-up care  Follow up with your healthcare provider, or as advised.  When to seek medical advice  Call your healthcare provider right away if any of these occur:    Fever of 100.4 F (38 C) or higher    Spreading of the rash    Rash does not get better with treatment    Redness or swelling that gets worse    Rash becomes more painful    Foul-smelling fluid leaking from the skin    Rash improves, but then comes back   Date Last Reviewed: 11/1/2016     4940-2485 [employer]. [address] [place] [address]. All rights reserved. This information is not intended as a substitute for professional medical care. Always follow your healthcare professional's instructions.          Abscess (Incision & Drainage)  An abscess is sometimes called a boil. It happens when bacteria get trapped under the skin and start to grow. Pus forms inside the abscess as the body responds to the bacteria. An abscess can happen with an insect bite, ingrown hair, blocked oil gland, pimple, cyst, or puncture wound.  Your healthcare provider has drained the pus from your abscess. If the abscess pocket was large, your healthcare provider may have put in gauze packing. Your provider will need to remove it on your next visit. He or she may also replace it at that time. You may not need antibiotics to treat a simple abscess, unless the infection is spreading into the skin around the wound (cellulitis).  The wound will take about 1 to 2 weeks to heal, depending on the size of the abscess. Healthy tissue will grow from the bottom and sides of the opening until it seals over.  Home care  These tips can help your wound heal:    The wound may drain for the first 2 days. Cover the wound with a clean dry dressing. Change the dressing if it becomes soaked with blood or pus.    If a gauze packing was placed inside the abscess pocket, you may be told to remove it yourself. You may do this in the shower. Once the packing is removed, you should wash the area in the shower, or clean the area as directed by your provider. Continue to do this until the skin opening has closed. Make sure you wash your hands after changing the packing or cleaning the wound.    If you were prescribed antibiotics, take them as directed until they are all gone.    You may use acetaminophen or ibuprofen to control pain, unless another pain medicine was prescribed. If you have liver disease or ever had a stomach  ulcer, talk with your doctor before using these medicines.  Follow-up care  Follow up with your healthcare provider, or as advised. If a gauze packing was put in your wound, it should be removed in 1 to 2 days. Check your wound every day for any signs that the infection is getting worse. The signs are listed below.  When to seek medical advice  Call your healthcare provider right away if any of these occur:    Increasing redness or swelling    Red streaks in the skin leading away from the wound    Increasing local pain or swelling    Continued pus draining from the wound 2 days after treatment    Fever of 100.4 F (38 C) or higher, or as directed by your healthcare provider    Boil returns when you are at home  Date Last Reviewed: 9/1/2016 2000-2017 [employer]. [address] [place] [address], [place] [address]. All rights reserved. This information is not intended as a substitute for professional medical care. Always follow your healthcare professional's instructions.          Anal Fistula  The anal canal is the end portion of the intestinal tract. It includes the rectum and anus. Sometimes, an abnormal passage forms from the anal canal to the skin near the anus. This is called an anal fistula. Anal fistulas can also form from the anal canal to other organs, such as the vagina or urinary tract.  An anal fistula most often occurs due to an anal abscess or infection. It can also occur with certain conditions, such as Crohn s disease. Trauma to the anal canal and surgery can also lead to anal fistulas.  Symptoms of an anal fistula can include:    Pain in or near the rectum    Drainage, which may contain blood, pus, or both (the drainage may be constant or stop and start again)    Bleeding from the rectum    Urinary problems  If you have an anal abscess or infection along with a fistula, you may also notice redness, swelling, or soreness in or near the anus or rectum. You may have a fever as well.  If caused by  Crohn s disease, an anal fistula may respond to medicines such as antibiotics and immunosuppressants. This may lead to complete closure of the fistula. But once treatment stops, there is a high chance that the fistula may form again.  Anal fistulas often require surgery if other treatments don t correct the problem. The type of surgery depends on the type of fistula. More than one surgery may be required.  Please discuss all forms of treatment with your healthcare provider.  Home care  As you recover from treatment, make sure to take any prescribed medicines as directed. Do not take any over-the-counter medicines without first talking to your healthcare provider.  You may also be advised to:    Soak in a warm bath 3 or 4 times a day.    Wear a pad over your anal area as directed.    Eat a diet high in fiber.    Drink plenty of fluids.    Use a stool softener or bulk laxative as needed.    Return to your normal routine only after being cleared by your healthcare provider.  Follow-up care  Follow up with your healthcare provider, or as advised.  When to seek medical advice  Call your healthcare provider right away if any of these occur:    Fever of 100.4 F (38 C) or higher, or as directed by your healthcare provider    Hard or painful stools or trouble controlling your bowel movements    Symptoms of anal fistula return    Increased pain, redness, swelling, or drainage in or near the anus or rectum    Pain in the belly that does not respond to treatment or that does not go away after a few hours    Swelling in the belly that does not go away after a few hours    Mucus, pus or blood in the stool (dark or bright red)    Vomiting that won t stop  Call 911  Call 911 if any of these occur:    Trouble breathing or swallowing    Fainting    Rapid heart rate    Large amounts of blood in stool  Resources  The resources below can help you learn more about anal fistulas. They may also help you find support if you have conditions  such as Crohn s disease.    National [place] of Diabetes and Digestive and Kidney Diseases (NIDDK), www.niddk.nih.gov    Crohn s and Colitis Foundation of [place], www.ccfa.org  Date Last Reviewed: 6/22/2015 2000-2017 [employer]. [address] [place] [address]. All rights reserved. This information is not intended as a substitute for professional medical care. Always follow your healthcare professional's instructions.

## 2018-09-18 ENCOUNTER — HOSPITAL ENCOUNTER (EMERGENCY)
Facility: CLINIC | Age: 48
Discharge: HOME | End: 2018-09-18
Attending: EMERGENCY MEDICINE | Admitting: EMERGENCY MEDICINE
Payer: COMMERCIAL

## 2018-09-18 VITALS — TEMPERATURE: 98.2 F | OXYGEN SATURATION: 99 % | DIASTOLIC BLOOD PRESSURE: 80 MMHG | SYSTOLIC BLOOD PRESSURE: 132 MMHG

## 2018-09-18 DIAGNOSIS — L03.90 CELLULITIS: ICD-10-CM

## 2018-09-18 PROCEDURE — 10060 I&D ABSCESS SIMPLE/SINGLE: CPT

## 2018-09-18 PROCEDURE — 99283 EMERGENCY DEPT VISIT LOW MDM: CPT | Mod: 25

## 2018-09-18 RX ORDER — LIDOCAINE HYDROCHLORIDE AND EPINEPHRINE 10; 10 MG/ML; UG/ML
INJECTION, SOLUTION INFILTRATION; PERINEURAL
Status: DISCONTINUED
Start: 2018-09-18 | End: 2018-09-18 | Stop reason: HOSPADM

## 2018-09-18 RX ORDER — HYDROCODONE BITARTRATE AND ACETAMINOPHEN 5; 325 MG/1; MG/1
1 TABLET ORAL EVERY 4 HOURS PRN
Qty: 18 TABLET | Refills: 0 | Status: SHIPPED | OUTPATIENT
Start: 2018-09-18 | End: 2018-10-03

## 2018-09-18 ASSESSMENT — ENCOUNTER SYMPTOMS
COLOR CHANGE: 1
WOUND: 1
FEVER: 0

## 2018-09-18 NOTE — ED AVS SNAPSHOT
[facility] Emergency Department    [address]    [facility] [address]    Phone:  [phone]    Fax:  [phone]                                       Justice Person   MRN: [id number]    Department:  [facility] Emergency Department   Date of Visit:  9/18/2018           After Visit Summary Signature Page     I have received my discharge instructions, and my questions have been answered. I have discussed any challenges I see with this plan with the nurse or doctor.    ..........................................................................................................................................  Patient/Patient Representative Signature      ..........................................................................................................................................  Patient Representative Print Name and Relationship to Patient    ..................................................               ................................................  Date                                   Time    ..........................................................................................................................................  Reviewed by Signature/Title    ...................................................              ..............................................  Date                                               Time          22EPIC Rev 08/18

## 2018-09-18 NOTE — DISCHARGE INSTRUCTIONS
Have Dr Mitchell check wound tomorrow at your appointment.  Keep dressing on until then and continue the antibiotics the ER doctor started you on yesterday.

## 2018-09-18 NOTE — ED PROVIDER NOTES
History     Chief Complaint:  Abscess    The history is provided by the patient.      Justice Person is a 47 year old male with a history of abscess and hemorrhoids who presents to the emergency department with his wife for evaluation of an abscess. Approximately 1 week ago the patient was seen in urgent care for evaluation of hemorrhoids that he has since been treating with hemorrhoid cream. 4 days ago, the patient reports the onset of a perineal abscess. Yesterday, the patient reports another abscess on his left groin prompting him to be seen in the ED where an I&D was done and the patient was discharged on Augmentin. Since his discharge the patient notes that the abscess has persisted and noted that it has significantly increased in size of redness, prompting the patient to seek further evaluation here in the ED. Here, the patient complains of the left inguinal abscess and notes that he has follow up for the perineal abscess with the colorectal surgeon, Dr. Mitchell, tomorrow. The patient denies any fevers.     Allergies:  NKDA    Medications:    Allegra   Hydrocortisone Cream  Nasonex  Augmentin    Past Medical History:    Chronic Back Pain  Abscess  Hemorrhoids    Past Surgical History:    The patient does not have any pertinent past surgical history.    Family History:    Diabetes    Social History:  Marital Status:   [2]  Tobacco Use: No  Alcohol Use: Yes    Review of Systems   Constitutional: Negative for fever.   Skin: Positive for color change (Redness) and wound (Abscess).   All other systems reviewed and are negative.      Physical Exam     Patient Vitals for the past 24 hrs:   BP Temp Temp src Heart Rate SpO2   09/18/18 1645 - - - - 99 %   09/18/18 1644 132/80 98.2  F (36.8  C) Oral 99 99 %       Physical Exam  General: Lying on the bed, comfortable appearing  HEENT:   The scalp and head appear normal    The pupils are equal, round, and reactive to light    Extraocular muscles are intact.    The  nose is normal.    The oropharynx is normal.      Uvula is in the midline.    Neck:  Normal range of motion.    Lungs:  Clear.      No rales, no wheezing.      There is no tachypnea.  Non-labored.  Cardiac: Regular rate.      Normal S1 and S2.      No pathological murmur/rub    Abdomen: Soft. No distension, no localized tenderness or rebound.  MS:  Normal tone. Normal movement of all extremities.   Neurologic:     Normal mentation.  No cranial nerve deficits.  No focal motor or sensory                              changes.      Speech normal.  Psych:  Awake.     Alert.      Normal affect.      Appropriate interactions.  Skin:  Egg sized area of induration and swelling in the left inguinal region with moderate erythema and tenderness that spreads out laterally approximately 10 cm.      Emergency Department Course     Procedures:    Procedure: Incision and Drainage     LOCATIONS:  Left Groin     ANESTHESIA:  Local field block using Lidocaine 1% with epinephrine, total of 10 mLs     PREPARATION:  Cleansed with Betadine     PROCEDURE:  Area was incised with # 11 Blade ([place]) with a Single Straight incision.  Wound treatment included Deloculation and Purulent Drainage.  Packing consisted of Iodoform Gauze.  Appropriate dressing was applied to cover the area.    Patient Status:  Patient tolerated the procedure well. There were no complications.    Emergency Department Course:  1645 Nursing notes and vitals reviewed. I performed an exam of the patient as documented above.     I performed an I&D of the abscess as per the above procedure note.    3956 I rechecked the patient and discussed the results of his workup thus far.     Findings and plan explained to the patient. Patient discharged home with instructions regarding supportive care, medications, and reasons to return. The importance of close follow-up was reviewed.    I personally answered all related questions prior to discharge.      Impression & Plan       Medical Decision Making:  Justice Person is a 47 year old male who presents for recheck of an abscess. He had it aspirated yesterday. He stated that the redness has spread quite a bit. It was highlighted today with a marker, apparently there wasn't much redness there at all. He has been taking his Augmentin. There is a large egg sized area of induration and swelling that was anesthetized and then incised with a #11 blade as per the procedure note. A small amount of pus was relieved and then it was packed with 4 cm of 0.25 inch iodoform gauze and then a dressing was placed.       He has an appointment tomorrow with Dr. Mitchell for a perirectal abscess procedure at which time she can re-inspect the cellulitis to make sure it is defervescing.       Diagnosis:    ICD-10-CM    1. Cellulitis L03.90        Disposition:  discharged to home    Discharge Medications:  Discharge Medication List as of 9/18/2018  5:57 PM      START taking these medications    Details   HYDROcodone-acetaminophen (NORCO) 5-325 MG per tablet Take 1 tablet by mouth every 4 hours as needed for pain, Disp-18 tablet, R-0, Local Print           Scribe Disclosure:  I, Gurdeep Zhang, am serving as a scribe on 9/18/2018 at 4:45 PM to personally document services performed by Kenny Peres MD based on my observations and the provider's statements to me.     Gurdeep Zhang  9/18/2018   [facility] EMERGENCY DEPARTMENT       Kenny Peres MD  09/19/18 0960

## 2018-09-18 NOTE — ED TRIAGE NOTES
Pt in ED yesterday with abcess to left groin and around rectum.  Both sites were drained yesterday.  Pt is concerned that abcess to left groin is getting worse.  States that the swelling and redness to left groin has worsened.  Taking ABX.

## 2018-10-01 ENCOUNTER — OFFICE VISIT (OUTPATIENT)
Dept: URGENT CARE | Facility: URGENT CARE | Age: 48
End: 2018-10-01
Payer: COMMERCIAL

## 2018-10-01 VITALS
OXYGEN SATURATION: 97 % | HEART RATE: 64 BPM | SYSTOLIC BLOOD PRESSURE: 120 MMHG | TEMPERATURE: 98 F | DIASTOLIC BLOOD PRESSURE: 82 MMHG

## 2018-10-01 DIAGNOSIS — L73.9 FOLLICULITIS: Primary | ICD-10-CM

## 2018-10-01 DIAGNOSIS — L03.115 CELLULITIS OF RIGHT THIGH: ICD-10-CM

## 2018-10-01 DIAGNOSIS — L03.012 CELLULITIS OF LEFT RING FINGER: ICD-10-CM

## 2018-10-01 PROCEDURE — 99213 OFFICE O/P EST LOW 20 MIN: CPT | Performed by: FAMILY MEDICINE

## 2018-10-01 RX ORDER — SULFAMETHOXAZOLE/TRIMETHOPRIM 800-160 MG
1 TABLET ORAL 2 TIMES DAILY
Qty: 20 TABLET | Refills: 0 | Status: ON HOLD | OUTPATIENT
Start: 2018-10-01 | End: 2019-05-26

## 2018-10-01 RX ORDER — MUPIROCIN 20 MG/G
OINTMENT TOPICAL 2 TIMES DAILY
Qty: 22 G | Refills: 3 | Status: ON HOLD | OUTPATIENT
Start: 2018-10-01 | End: 2018-10-06

## 2018-10-01 NOTE — PROGRESS NOTES
SUBJECTIVE:   Chief Complaint   Patient presents with     Urgent Care     Infection     Possible infected Lt ring finger x2 days- swollen, redness visible, pus discharge     Justice Person is a 47 year old male who presents for evaluation of and area of redness, tenderness, swelling and warmth of the skin that developed on the  left, dorsal 4th finger over the middle phalanyx   . Also has a large and small area over the right anterior thigh  Patient has had pain, purulent (pus) drainage , skin erythema (reddened skin) and tenderness for 4 days.    Precipitating event was unknown,   He was evaluated in ER 2 x with incision and drainage of abcess in left groin,  And he had a leander-rectal abcess rupture 2 days before.  He is following with [facility]-rectal surgery concerning the pelvic abcesses.    Therapies tried: local cleaning.    No past medical history on file.  Patient Active Problem List   Diagnosis     Upper back pain     CARDIOVASCULAR SCREENING; LDL GOAL LESS THAN 160       ALLERGIES:  No known drug allergies      Current Outpatient Prescriptions on File Prior to Visit:  ALLEGRA 180 MG PO TABS 1 tablet qd  MD appointment needed   HYDROcodone-acetaminophen (NORCO) 5-325 MG per tablet Take 1 tablet by mouth every 4 hours as needed for pain   NASONEX 50 MCG/ACT NA SUSP 2 sprays each nostril Once daily for allergies     No current facility-administered medications on file prior to visit.     Social History   Substance Use Topics     Smoking status: Never Smoker     Smokeless tobacco: Never Used     Alcohol use Yes       Family History   Problem Relation Age of Onset     Diabetes Maternal Grandmother      Cancer - colorectal No family hx of      Prostate Cancer No family hx of        ROS:  CONSTITUTIONAL:NEGATIVE for fever, chills,    EYES: NEGATIVE for vision changes or irritation  ENT/MOUTH: NEGATIVE for ear, mouth and throat problems  RESP:NEGATIVE for significant cough or SOB  GI: NEGATIVE for nausea, abdominal  pain,     OBJECTIVE:  /82 (BP Location: Right arm, Patient Position: Chair, Cuff Size: Adult Regular)  Pulse 64  Temp 98  F (36.7  C) (Oral)  SpO2 97%    SKIN:  Left 4th finger middle phalanyx dorsal area involved is 2 cm by 1.5 cm    The skin appear erythematous, moderately swollen, with tenderness and warmth to the touch.  Has scabbed over pustule in the center     Right anterior upper thigh  area involved is 12 cm by 6 cm  And a second spot 1 x 1 cm  .   The skin appear erythematous, moderately swollen, with tenderness and warmth to the touch.-  No fluctuance    GENERAL:  Alert, mild distress  EYES: EOMI,   conjunctiva clear  HENT: External ears with no swelling or lesions   Nose and lips without  Swelling, ulcers, erythema or lesions  NECK: normal pain free ROM  RESP: no labored respirations, no tachypnea  EXTREMITIES:   Full ROM without expression of pain or limitation x 4 extremities  NEURO: Normal strength and tone, ambulation without difficulty,   normal speech and mentation  PSYCH: mentation and affect appears normal and patient appearance--appropriately groomed       ASSESSMENT:  Folliculitis     - chlorhexidine (HIBICLENS) 4 % liquid; Apply topically daily as needed for wound care May use for bathing wounds    Cellulitis of left ring finger     - amoxicillin-clavulanate (AUGMENTIN) 875-125 MG per tablet; Take 1 tablet by mouth 2 times daily  - sulfamethoxazole-trimethoprim (BACTRIM DS/SEPTRA DS) 800-160 MG per tablet; Take 1 tablet by mouth 2 times daily  - mupirocin (BACTROBAN) 2 % ointment; Apply topically 2 times daily for 7 days  - chlorhexidine (HIBICLENS) 4 % liquid; Apply topically daily as needed for wound care May use for bathing wounds    Cellulitis of right thigh     - amoxicillin-clavulanate (AUGMENTIN) 875-125 MG per tablet; Take 1 tablet by mouth 2 times daily  - sulfamethoxazole-trimethoprim (BACTRIM DS/SEPTRA DS) 800-160 MG per tablet; Take 1 tablet by mouth 2 times daily  -  chlorhexidine (HIBICLENS) 4 % liquid; Apply topically daily as needed for wound care May use for bathing wounds       Wound culture  Ordered for future- if he has purulent pustule he should come to clinic for culture    patient to monitor for signs or symptoms of worsening/ spreading infection.  Go to Urgent care or Emergency Department if worsening fevers/chills and/or spreading infection.  Warm, moist packs or towels can be applied to the region to aid in resolution of the infection.  Use Tylenol or ibuprofen for pain or fever.  Discussed pro-biotics to protect his gut taking strong antibiotics    Continue follow-up with colo-rectal  Concerning pelvic abcesses

## 2018-10-01 NOTE — MR AVS SNAPSHOT
After Visit Summary   10/1/2018    Justice Person    MRN: [id number]           Patient Information     Date Of Birth          1970        Visit Information        Provider Department      10/1/2018 3:35 PM Cat Rob MD [facility] URGENT [facility]ARE        Today's Diagnoses     Folliculitis    -  1    Cellulitis of left ring finger        Cellulitis of right thigh          Care Instructions      Folliculitis  Folliculitis is an inflammation of a hair follicle. A hair follicle is the little pocket where a hair grows out of the skin. Bacteria normally live on the skin. But sometimes bacteria can get trapped in a follicle and cause infection. This causes a bumpy rash. The area over the follicles is red and raised. It may itch or be painful. The bumps may have fluid (pus) inside. The pus may leak and then form crusts. Sores can spread to other areas of the body. Once it goes away, folliculitis can come back at any time. Severe cases may cause permanent hair loss and scarring.  Folliculitis can happen anywhere on the body where hair grows. It can be caused by rubbing from tight clothing. Ingrown hairs can cause it. Soaking in a hot tub or swimming pool that has bacteria in the water can cause it. It may also occur if a hair follicle is blocked by a bandage.  Sores often go away in a few days with no treatment. In some cases, medicine may be given. A small piece of skin or pus may be taken to find the type of bacteria causing the infection.  Home care  The healthcare provider may prescribe an antibiotic cream or ointment.  Oral antibiotics may also be prescribed. Or you may be told to use an over-the-counter antibiotic cream. Follow all instructions when using any of these medicines.  General care:    Apply warm, moist compresses to the sores for 20 minutes up to 3 times a day. You can make a compress by soaking a cloth in warm water. Squeeze out excess water.    Don t cut, poke, or squeeze  the sores. This can be painful and spread infection.    Don t scratch the affected area. Scratching can delay healing.    Don t shave the areas affected by folliculitis.    If the sores leak fluid, cover the area with a nonstick gauze bandage. Use as little tape as possible. Carefully discard all soiled bandages.    Dress in loose cotton clothing.    Change sheets and blankets if they are soiled by pus. Wash all clothes, towels, sheets, and cloth diapers in soap and hot water. Do not share clothes, towels, or sheets with other family members.    Do not soak the sores in bath water. This can spread infection. Instead, keep the area clean by gently washing sores with soap and warm water.    Wash your hands or use antibacterial gels often to prevent spreading the bacteria.  Follow-up care  Follow up with your healthcare provider, or as advised.  When to seek medical advice  Call your healthcare provider right away if any of these occur:    Fever of 100.4 F (38 C) or higher    Spreading of the rash    Rash does not get better with treatment    Redness or swelling that gets worse    Rash becomes more painful    Foul-smelling fluid leaking from the skin    Rash improves, but then comes back   Date Last Reviewed: 11/1/2016 2000-2017 [employer]. [address] [place] [address], [place] [address]. All rights reserved. This information is not intended as a substitute for professional medical care. Always follow your healthcare professional's instructions.        Staph Infection (MRSA)  Staph is the short name for the common bacteria called staphylococcus aureus. Staph bacteria are often present on the skin without causing an infection. If it gets under the skin an infection occurs. This causes redness, tenderness, swelling and sometimes fluid drainage.  MRSA stands for methicillin-resistant staph aureus. Unlike a common staph infection, MRSA bacteria are resistant to the usual antibiotics and harder to treat. Also,  MRSA is more toxic than common staph bacteria. It can spread quickly throughout the body and cause a life-threatening illness.  MRSA is spread to others by direct physical contact with the bacteria. MRSA can also be transmitted from items contaminated by a person who has the bacteria, such as bandages, towels, bed sheets, or sports equipment. It is generally not spread through the air.  But you can acquire it if you come in direct contact with the fluid from someone's cough or sneeze.  Once you have a MRSA skin infection, you are at risk of having it again in the future.  If MRSA infection is suspected, the healthcare provider may take a wound culture to confirm the diagnosis. If an abscess is present, it may be drained. One or more antibiotics that work against MRSA will likely be prescribed.  Home care    Take any antibiotics prescribed exactly as directed. Do not stop taking them until they are gone or your healthcare provider tells you to stop, even if you feel better.    If antibiotic ointment was prescribed, use it as directed.    Wash your whole body (scalp to toes) daily for 5 days with prescription soap. Scrub fingernails with a brush for 1 minute twice a day with prescription soap.    Keep wounds covered with clean, dry bandages. Change dressings as they become soiled. Wash your hands well each time you change the bandage or touch the wound.    Remove any artificial nails and nail polish.  Treating household members  If you have been diagnosed with possible MRSA infection, those living with you are at higher risk of carrying the bacteria on their skin or in their nose, even if there is no sign of infection. Bacteria must be removed from the skin of all household members at the same time so it is not passed back and forth. Advise them to remove the bacteria as follows:    Household member should wash with prescription soap as outlined above.    If anyone in the household has a skin infection, it must be  treated by a healthcare provider. Washing alone will not treat a MRSA infection.    Clean counter tops and children's toys.    Do not share personal items such as toothbrush and razors. It is okay to share glasses, plates, and utensils.  Preventing spread of infection    Wash your hands frequently with plain soap and warm water. Be certain to clean under the fingernails, between the fingers, and the wrists. Dry hands with a single use towel (for example a paper towel). If soap and water are not available, you can use an alcohol-based hand . Rub the  over the entire surface of the hands, fingers, and wrists until dry.    Avoid sharing personal items such as towels, washcloths, razors, clothing, or uniforms. Wash soiled sheets, towels or clothes in hot water with laundry detergent. Use an automatic clothes dryer set on high to kill any remaining bacteria.    If you use a gym, wipe down equipment with an alcohol-based  before and after each use.  Wipe the handgrips as well.    If you participate in sports, shower with plain soap after every activity. Use a clean towel for each shower.  Follow-up care  Follow-up with your healthcare provider or as advised by our staff. If a wound culture was taken, call as directed for the results. You will be told about any changes to your treatment.  If you are diagnosed with MRSA, tell medical personnel in the future that you have been treated for this type of infection.  When to seek medical advice  Call your healthcare provider if any of the following occur:    Increasing redness, swelling or pain    Red streaks in the skin around the wound    Weakness or dizziness    New appearance of pus or drainage from the wound    New fever over 100.4  F (38.0  C), or as directed by the healthcare provider  Date Last Reviewed: 9/25/2015 2000-2017 [employer]. [address] [place] [address], [place] [address]. All rights reserved. This information is not  intended as a substitute for professional medical care. Always follow your healthcare professional's instructions.                Follow-ups after your visit        Your next 10 appointments already scheduled     Dec 10, 2018   Procedure with Teagan Mitchell MD   [facility] ([facility])    [address] [facility] [address]   [phone]           [facility] is located at [address] [facility]. [place]              Who to contact     If you have questions or need follow up information about today's clinic visit or your schedule please contact [facility] directly at [phone].  Normal or non-critical lab and imaging results will be communicated to you by MyChart, letter or phone within 4 business days after the clinic has received the results. If you do not hear from us within 7 days, please contact the clinic through MyChart or phone. If you have a critical or abnormal lab result, we will notify you by phone as soon as possible.  Submit refill requests through [employer] or call your pharmacy and they will forward the refill request to us. Please allow 3 business days for your refill to be completed.          Additional Information About Your Visit        Care EveryWhere ID     This is your Care EveryWhere ID. This could be used by other organizations to access your [facility] medical records  [id number]        Your Vitals Were     Pulse Temperature Pulse Oximetry             64 98  F (36.7  C) (Oral) 97%          Blood Pressure from Last 3 Encounters:   10/01/18 120/82   09/18/18 132/80   09/17/18 (!) 163/94    Weight from Last 3 Encounters:   09/17/18 194 lb (88 kg)   09/11/18 194 lb (88 kg)   02/19/09 203 lb 12.8 oz (92.4 kg)              Today, you had the following     No orders found for display         Today's Medication Changes          These changes are accurate as of 10/1/18  4:03 PM.  If you have any questions, ask your nurse  or doctor.               Start taking these medicines.        Dose/Directions    amoxicillin-clavulanate 875-125 MG per tablet   Commonly known as:  AUGMENTIN   Used for:  Cellulitis of right thigh, Cellulitis of left ring finger   Started by:  Cat Rob MD        Dose:  1 tablet   Take 1 tablet by mouth 2 times daily   Quantity:  20 tablet   Refills:  0       chlorhexidine 4 % liquid   Commonly known as:  HIBICLENS   Used for:  Cellulitis of right thigh, Cellulitis of left ring finger, Folliculitis   Started by:  Cat Rob MD        Apply topically daily as needed for wound care May use for bathing wounds   Quantity:  500 mL   Refills:  3       mupirocin 2 % ointment   Commonly known as:  BACTROBAN   Used for:  Cellulitis of left ring finger   Started by:  Cat Rob MD        Apply topically 2 times daily for 7 days   Quantity:  22 g   Refills:  3       sulfamethoxazole-trimethoprim 800-160 MG per tablet   Commonly known as:  BACTRIM DS/SEPTRA DS   Used for:  Cellulitis of right thigh, Cellulitis of left ring finger   Started by:  Cat Rob MD        Dose:  1 tablet   Take 1 tablet by mouth 2 times daily   Quantity:  20 tablet   Refills:  0            Where to get your medicines      These medications were sent to [facility] [address] [place] [address] [address] [facility]  [address] [address]     Phone:  [phone]     amoxicillin-clavulanate 875-125 MG per tablet    chlorhexidine 4 % liquid    mupirocin 2 % ointment    sulfamethoxazole-trimethoprim 800-160 MG per tablet                Primary Care Provider Fax #    Physician No Ref-Primary [phone]       No address on file        Equal Access to Services     [facility] : Kellen campos Somariah, waaxda luqadaha, qaybta kaalmada ju, noe cisneros. So [facility] [phone].    ATENCIÓN: Si habla español, tiene a orellana disposición servicios gratuitos de asistencia  lingüísticaRamila Santiago al [phone].    We comply with applicable federal civil rights laws and [place] laws. We do not discriminate on the basis of race, color, national origin, age, disability, sex, sexual orientation, or gender identity.            Thank you!     Thank you for choosing [facility]  for your care. Our goal is always to provide you with excellent care. Hearing back from our patients is one way we can continue to improve our services. Please take a few minutes to complete the written survey that you may receive in the mail after your visit with us. Thank you!             Your Updated Medication List - Protect others around you: Learn how to safely use, store and throw away your medicines at www.disposemymeds.org.          This list is accurate as of 10/1/18  4:03 PM.  Always use your most recent med list.                   Brand Name Dispense Instructions for use Diagnosis    ALLEGRA 180 MG tablet   Generic drug:  fexofenadine     90 Tab    1 tablet qd  MD appointment needed    Hay fever       amoxicillin-clavulanate 875-125 MG per tablet    AUGMENTIN    20 tablet    Take 1 tablet by mouth 2 times daily    Cellulitis of right thigh, Cellulitis of left ring finger       chlorhexidine 4 % liquid    HIBICLENS    500 mL    Apply topically daily as needed for wound care May use for bathing wounds    Cellulitis of right thigh, Cellulitis of left ring finger, Folliculitis       HYDROcodone-acetaminophen 5-325 MG per tablet    NORCO    18 tablet    Take 1 tablet by mouth every 4 hours as needed for pain        mupirocin 2 % ointment    BACTROBAN    22 g    Apply topically 2 times daily for 7 days    Cellulitis of left ring finger       NASONEX 50 MCG/ACT spray   Generic drug:  mometasone     3 months    2 sprays each nostril Once daily for allergies    Hay fever       sulfamethoxazole-trimethoprim 800-160 MG per tablet    BACTRIM DS/SEPTRA DS    20 tablet    Take 1 tablet by mouth 2  times daily    Cellulitis of right thigh, Cellulitis of left ring finger

## 2018-10-01 NOTE — PATIENT INSTRUCTIONS
Folliculitis  Folliculitis is an inflammation of a hair follicle. A hair follicle is the little pocket where a hair grows out of the skin. Bacteria normally live on the skin. But sometimes bacteria can get trapped in a follicle and cause infection. This causes a bumpy rash. The area over the follicles is red and raised. It may itch or be painful. The bumps may have fluid (pus) inside. The pus may leak and then form crusts. Sores can spread to other areas of the body. Once it goes away, folliculitis can come back at any time. Severe cases may cause permanent hair loss and scarring.  Folliculitis can happen anywhere on the body where hair grows. It can be caused by rubbing from tight clothing. Ingrown hairs can cause it. Soaking in a hot tub or swimming pool that has bacteria in the water can cause it. It may also occur if a hair follicle is blocked by a bandage.  Sores often go away in a few days with no treatment. In some cases, medicine may be given. A small piece of skin or pus may be taken to find the type of bacteria causing the infection.  Home care  The healthcare provider may prescribe an antibiotic cream or ointment.  Oral antibiotics may also be prescribed. Or you may be told to use an over-the-counter antibiotic cream. Follow all instructions when using any of these medicines.  General care:    Apply warm, moist compresses to the sores for 20 minutes up to 3 times a day. You can make a compress by soaking a cloth in warm water. Squeeze out excess water.    Don t cut, poke, or squeeze the sores. This can be painful and spread infection.    Don t scratch the affected area. Scratching can delay healing.    Don t shave the areas affected by folliculitis.    If the sores leak fluid, cover the area with a nonstick gauze bandage. Use as little tape as possible. Carefully discard all soiled bandages.    Dress in loose cotton clothing.    Change sheets and blankets if they are soiled by pus. Wash all clothes,  towels, sheets, and cloth diapers in soap and hot water. Do not share clothes, towels, or sheets with other family members.    Do not soak the sores in bath water. This can spread infection. Instead, keep the area clean by gently washing sores with soap and warm water.    Wash your hands or use antibacterial gels often to prevent spreading the bacteria.  Follow-up care  Follow up with your healthcare provider, or as advised.  When to seek medical advice  Call your healthcare provider right away if any of these occur:    Fever of 100.4 F (38 C) or higher    Spreading of the rash    Rash does not get better with treatment    Redness or swelling that gets worse    Rash becomes more painful    Foul-smelling fluid leaking from the skin    Rash improves, but then comes back   Date Last Reviewed: 11/1/2016 2000-2017 [employer]. [address] [place] [address]. All rights reserved. This information is not intended as a substitute for professional medical care. Always follow your healthcare professional's instructions.        Staph Infection (MRSA)  Staph is the short name for the common bacteria called staphylococcus aureus. Staph bacteria are often present on the skin without causing an infection. If it gets under the skin an infection occurs. This causes redness, tenderness, swelling and sometimes fluid drainage.  MRSA stands for methicillin-resistant staph aureus. Unlike a common staph infection, MRSA bacteria are resistant to the usual antibiotics and harder to treat. Also, MRSA is more toxic than common staph bacteria. It can spread quickly throughout the body and cause a life-threatening illness.  MRSA is spread to others by direct physical contact with the bacteria. MRSA can also be transmitted from items contaminated by a person who has the bacteria, such as bandages, towels, bed sheets, or sports equipment. It is generally not spread through the air.  But you can acquire it if you come  in direct contact with the fluid from someone's cough or sneeze.  Once you have a MRSA skin infection, you are at risk of having it again in the future.  If MRSA infection is suspected, the healthcare provider may take a wound culture to confirm the diagnosis. If an abscess is present, it may be drained. One or more antibiotics that work against MRSA will likely be prescribed.  Home care    Take any antibiotics prescribed exactly as directed. Do not stop taking them until they are gone or your healthcare provider tells you to stop, even if you feel better.    If antibiotic ointment was prescribed, use it as directed.    Wash your whole body (scalp to toes) daily for 5 days with prescription soap. Scrub fingernails with a brush for 1 minute twice a day with prescription soap.    Keep wounds covered with clean, dry bandages. Change dressings as they become soiled. Wash your hands well each time you change the bandage or touch the wound.    Remove any artificial nails and nail polish.  Treating household members  If you have been diagnosed with possible MRSA infection, those living with you are at higher risk of carrying the bacteria on their skin or in their nose, even if there is no sign of infection. Bacteria must be removed from the skin of all household members at the same time so it is not passed back and forth. Advise them to remove the bacteria as follows:    Household member should wash with prescription soap as outlined above.    If anyone in the household has a skin infection, it must be treated by a healthcare provider. Washing alone will not treat a MRSA infection.    Clean counter tops and children's toys.    Do not share personal items such as toothbrush and razors. It is okay to share glasses, plates, and utensils.  Preventing spread of infection    Wash your hands frequently with plain soap and warm water. Be certain to clean under the fingernails, between the fingers, and the wrists. Dry hands with a  single use towel (for example a paper towel). If soap and water are not available, you can use an alcohol-based hand . Rub the  over the entire surface of the hands, fingers, and wrists until dry.    Avoid sharing personal items such as towels, washcloths, razors, clothing, or uniforms. Wash soiled sheets, towels or clothes in hot water with laundry detergent. Use an automatic clothes dryer set on high to kill any remaining bacteria.    If you use a gym, wipe down equipment with an alcohol-based  before and after each use.  Wipe the handgrips as well.    If you participate in sports, shower with plain soap after every activity. Use a clean towel for each shower.  Follow-up care  Follow-up with your healthcare provider or as advised by our staff. If a wound culture was taken, call as directed for the results. You will be told about any changes to your treatment.  If you are diagnosed with MRSA, tell medical personnel in the future that you have been treated for this type of infection.  When to seek medical advice  Call your healthcare provider if any of the following occur:    Increasing redness, swelling or pain    Red streaks in the skin around the wound    Weakness or dizziness    New appearance of pus or drainage from the wound    New fever over 100.4  F (38.0  C), or as directed by the healthcare provider  Date Last Reviewed: 9/25/2015 2000-2017 [employer]. [address] [place] [address], [place] [id number]. All rights reserved. This information is not intended as a substitute for professional medical care. Always follow your healthcare professional's instructions.

## 2018-10-03 ENCOUNTER — APPOINTMENT (OUTPATIENT)
Dept: GENERAL RADIOLOGY | Facility: CLINIC | Age: 48
End: 2018-10-03
Attending: NURSE PRACTITIONER
Payer: COMMERCIAL

## 2018-10-03 ENCOUNTER — HOSPITAL ENCOUNTER (INPATIENT)
Facility: CLINIC | Age: 48
LOS: 3 days | Discharge: HOME | End: 2018-10-06
Attending: NURSE PRACTITIONER | Admitting: INTERNAL MEDICINE
Payer: COMMERCIAL

## 2018-10-03 DIAGNOSIS — L02.91 ABSCESS: ICD-10-CM

## 2018-10-03 DIAGNOSIS — L03.314 CELLULITIS OF GROIN, RIGHT: ICD-10-CM

## 2018-10-03 DIAGNOSIS — R79.89 ELEVATED SERUM CREATININE: ICD-10-CM

## 2018-10-03 DIAGNOSIS — L03.012 CELLULITIS OF FINGER OF LEFT HAND: ICD-10-CM

## 2018-10-03 DIAGNOSIS — A49.02 MRSA INFECTION: Primary | ICD-10-CM

## 2018-10-03 LAB
ANION GAP SERPL CALCULATED.3IONS-SCNC: 7 MMOL/L (ref 3–14)
BASOPHILS # BLD AUTO: 0 10E9/L (ref 0–0.2)
BASOPHILS NFR BLD AUTO: 0.4 %
BUN SERPL-MCNC: 16 MG/DL (ref 7–30)
CALCIUM SERPL-MCNC: 9.6 MG/DL (ref 8.5–10.1)
CHLORIDE SERPL-SCNC: 105 MMOL/L (ref 94–109)
CO2 SERPL-SCNC: 27 MMOL/L (ref 20–32)
CREAT SERPL-MCNC: 1.29 MG/DL (ref 0.66–1.25)
DIFFERENTIAL METHOD BLD: ABNORMAL
EOSINOPHIL # BLD AUTO: 0.1 10E9/L (ref 0–0.7)
EOSINOPHIL NFR BLD AUTO: 1 %
ERYTHROCYTE [DISTWIDTH] IN BLOOD BY AUTOMATED COUNT: 12.7 % (ref 10–15)
GFR SERPL CREATININE-BSD FRML MDRD: 59 ML/MIN/1.7M2
GLUCOSE SERPL-MCNC: 86 MG/DL (ref 70–99)
HCT VFR BLD AUTO: 49 % (ref 40–53)
HGB BLD-MCNC: 16.7 G/DL (ref 13.3–17.7)
IMM GRANULOCYTES # BLD: 0 10E9/L (ref 0–0.4)
IMM GRANULOCYTES NFR BLD: 0.4 %
LYMPHOCYTES # BLD AUTO: 1.1 10E9/L (ref 0.8–5.3)
LYMPHOCYTES NFR BLD AUTO: 9.8 %
MCH RBC QN AUTO: 30.4 PG (ref 26.5–33)
MCHC RBC AUTO-ENTMCNC: 34.1 G/DL (ref 31.5–36.5)
MCV RBC AUTO: 89 FL (ref 78–100)
MONOCYTES # BLD AUTO: 0.6 10E9/L (ref 0–1.3)
MONOCYTES NFR BLD AUTO: 5 %
NEUTROPHILS # BLD AUTO: 9.3 10E9/L (ref 1.6–8.3)
NEUTROPHILS NFR BLD AUTO: 83.4 %
NRBC # BLD AUTO: 0 10*3/UL
NRBC BLD AUTO-RTO: 0 /100
PLATELET # BLD AUTO: 333 10E9/L (ref 150–450)
POTASSIUM SERPL-SCNC: 4.1 MMOL/L (ref 3.4–5.3)
RBC # BLD AUTO: 5.5 10E12/L (ref 4.4–5.9)
SODIUM SERPL-SCNC: 139 MMOL/L (ref 133–144)
WBC # BLD AUTO: 11.1 10E9/L (ref 4–11)

## 2018-10-03 PROCEDURE — 10061 I&D ABSCESS COMP/MULTIPLE: CPT

## 2018-10-03 PROCEDURE — 87186 SC STD MICRODIL/AGAR DIL: CPT | Performed by: NURSE PRACTITIONER

## 2018-10-03 PROCEDURE — 87077 CULTURE AEROBIC IDENTIFY: CPT | Performed by: NURSE PRACTITIONER

## 2018-10-03 PROCEDURE — 85025 COMPLETE CBC W/AUTO DIFF WBC: CPT | Performed by: NURSE PRACTITIONER

## 2018-10-03 PROCEDURE — 80048 BASIC METABOLIC PNL TOTAL CA: CPT | Performed by: NURSE PRACTITIONER

## 2018-10-03 PROCEDURE — 96365 THER/PROPH/DIAG IV INF INIT: CPT

## 2018-10-03 PROCEDURE — 87641 MR-STAPH DNA AMP PROBE: CPT | Performed by: INTERNAL MEDICINE

## 2018-10-03 PROCEDURE — 87640 STAPH A DNA AMP PROBE: CPT | Performed by: INTERNAL MEDICINE

## 2018-10-03 PROCEDURE — 25000128 H RX IP 250 OP 636: Performed by: NURSE PRACTITIONER

## 2018-10-03 PROCEDURE — 0J9L3ZZ DRAINAGE OF RIGHT UPPER LEG SUBCUTANEOUS TISSUE AND FASCIA, PERCUTANEOUS APPROACH: ICD-10-PCS | Performed by: INTERNAL MEDICINE

## 2018-10-03 PROCEDURE — 99207 ZZC CDG-HISTORY COMP: MEETS EXP. PROBLEM FOCUSED-DOWN CODED LACK OF ROS: CPT | Performed by: INTERNAL MEDICINE

## 2018-10-03 PROCEDURE — 96366 THER/PROPH/DIAG IV INF ADDON: CPT

## 2018-10-03 PROCEDURE — 12000000 ZZH R&B MED SURG/OB

## 2018-10-03 PROCEDURE — 73140 X-RAY EXAM OF FINGER(S): CPT | Mod: LT

## 2018-10-03 PROCEDURE — 87070 CULTURE OTHR SPECIMN AEROBIC: CPT | Performed by: NURSE PRACTITIONER

## 2018-10-03 PROCEDURE — 99285 EMERGENCY DEPT VISIT HI MDM: CPT | Mod: 25

## 2018-10-03 PROCEDURE — 25000128 H RX IP 250 OP 636: Performed by: INTERNAL MEDICINE

## 2018-10-03 PROCEDURE — 25000132 ZZH RX MED GY IP 250 OP 250 PS 637: Performed by: INTERNAL MEDICINE

## 2018-10-03 PROCEDURE — 99233 SBSQ HOSP IP/OBS HIGH 50: CPT | Performed by: INTERNAL MEDICINE

## 2018-10-03 PROCEDURE — 96367 TX/PROPH/DG ADDL SEQ IV INF: CPT

## 2018-10-03 RX ORDER — DOXYCYCLINE 100 MG/10ML
100 INJECTION, POWDER, LYOPHILIZED, FOR SOLUTION INTRAVENOUS ONCE
Status: DISCONTINUED | OUTPATIENT
Start: 2018-10-03 | End: 2018-10-03

## 2018-10-03 RX ORDER — CEFAZOLIN SODIUM 1 G/50ML
1250 SOLUTION INTRAVENOUS ONCE
Status: COMPLETED | OUTPATIENT
Start: 2018-10-03 | End: 2018-10-03

## 2018-10-03 RX ORDER — TRAMADOL HYDROCHLORIDE 50 MG/1
50 TABLET ORAL EVERY 6 HOURS PRN
Status: DISCONTINUED | OUTPATIENT
Start: 2018-10-03 | End: 2018-10-06 | Stop reason: HOSPADM

## 2018-10-03 RX ORDER — ACETAMINOPHEN 325 MG/1
650 TABLET ORAL EVERY 6 HOURS PRN
Status: DISCONTINUED | OUTPATIENT
Start: 2018-10-03 | End: 2018-10-04

## 2018-10-03 RX ORDER — NALOXONE HYDROCHLORIDE 0.4 MG/ML
.1-.4 INJECTION, SOLUTION INTRAMUSCULAR; INTRAVENOUS; SUBCUTANEOUS
Status: DISCONTINUED | OUTPATIENT
Start: 2018-10-03 | End: 2018-10-04

## 2018-10-03 RX ADMIN — TRAMADOL HYDROCHLORIDE 50 MG: 50 TABLET, COATED ORAL at 18:15

## 2018-10-03 RX ADMIN — SODIUM CHLORIDE 1000 ML: 9 INJECTION, SOLUTION INTRAVENOUS at 18:11

## 2018-10-03 RX ADMIN — TAZOBACTAM SODIUM AND PIPERACILLIN SODIUM 3.38 G: 375; 3 INJECTION, SOLUTION INTRAVENOUS at 16:27

## 2018-10-03 RX ADMIN — TAZOBACTAM SODIUM AND PIPERACILLIN SODIUM 3.38 G: 375; 3 INJECTION, SOLUTION INTRAVENOUS at 22:09

## 2018-10-03 RX ADMIN — ACETAMINOPHEN 650 MG: 325 TABLET, FILM COATED ORAL at 19:28

## 2018-10-03 RX ADMIN — VANCOMYCIN HYDROCHLORIDE 1250 MG: 10 INJECTION, POWDER, LYOPHILIZED, FOR SOLUTION INTRAVENOUS at 17:00

## 2018-10-03 ASSESSMENT — ENCOUNTER SYMPTOMS
COLOR CHANGE: 1
WOUND: 1
FEVER: 0
VOMITING: 0

## 2018-10-03 ASSESSMENT — PAIN DESCRIPTION - DESCRIPTORS: DESCRIPTORS: THROBBING

## 2018-10-03 NOTE — ED TRIAGE NOTES
Pt has a red, swollen left ring finger for past 4 days.  Pt also one on his right inner thigh.  Pt has has 2 skin issues previously this month that he has had treatment for that are similar but different location.

## 2018-10-03 NOTE — PHARMACY-ADMISSION MEDICATION HISTORY
Admission medication history interview status for this patient is complete. See [facility] admission navigator for allergy information, prior to admission medications and immunization status.     Medication history interview source(s):Patient  Medication history resources (including written lists, pill bottles, clinic record):None    Changes made to PTA medication list:  Added: none  Deleted: allegra, nasonex, and norco  Changed: none    Actions taken by pharmacist (provider contacted, etc):None     Additional medication history information:None    Medication reconciliation/reorder completed by provider prior to medication history? No    For patients on insulin therapy: no (Yes/No)   Lantus/levemir/NPH/Mix 70/30 dose: ___ in AM/PM or twice daily   Sliding scale Novolog Y/N   If Yes, do you have a baseline novolog pre-meal dose: ______units with meals   Patients eat three meals a day: Y/N ---  How many episodes of hypoglycemia (low blood glucose) do you have weekly: ---   How many missed doses do you have a week: ---  How many times do you check your blood glucose per day: ---  Any Barriers to therapy: cost of medications/comfortable with giving injections (if applicable)/ comfortable and confident with current diabetes regimen ---      Prior to Admission medications    Medication Sig Last Dose Taking? Auth Provider   amoxicillin-clavulanate (AUGMENTIN) 875-125 MG per tablet Take 1 tablet by mouth 2 times daily 10/3/2018 at am Yes Cat Rob MD   chlorhexidine (HIBICLENS) 4 % liquid Apply topically daily as needed for wound care May use for bathing wounds 10/3/2018 at 1400 Yes Cat Rob MD   mupirocin (BACTROBAN) 2 % ointment Apply topically 2 times daily for 7 days 10/2/2018 at Unknown time Yes Cat Rob MD   sulfamethoxazole-trimethoprim (BACTRIM DS/SEPTRA DS) 800-160 MG per tablet Take 1 tablet by mouth 2 times daily 10/3/2018 at 0700 Yes Cat Rob MD

## 2018-10-03 NOTE — ED NOTES
"[facility]  ED Nurse Handoff Report    Justice Person is a 47 year old male   ED Chief complaint: Cellulitis  . ED Diagnosis:   Final diagnoses:   Cellulitis of finger of left hand   Cellulitis of groin, right     Allergies:   Allergies   Allergen Reactions     No Known Drug Allergies        Code Status: Full Code  Activity level - Baseline/Home:  Independent. Activity Level - Current:   Independent. Lift room needed: No. Bariatric: No   Needed: No   Isolation: No. Infection: Not Applicable.     Vital Signs:   Vitals:    10/03/18 1446   BP: 137/79   Pulse: 92   Resp: 20   Temp: 98.2  F (36.8  C)   TempSrc: Oral   SpO2: 94%   Weight: 88.5 kg (195 lb)   Height: 1.778 m (5' 10\")       Cardiac Rhythm:  ,      Pain level: 0-10 Pain Scale: 9  Patient confused: No. Patient Falls Risk: No.   Elimination Status: Has voided   Patient Report - Initial Complaint: cellulitis. Focused Assessment: Pt has known cellulitis to left ring finger. Pt states that he was seen at urgent care 2 days ago where he was put on 2 different antibiotics. Pt did not see any improvement after being on the antibiotics for 48 hrs. Pt is having increased redness, pain and swelling in the left ring finger.    Tests Performed: lab work, xray. Abnormal Results:   Labs Ordered and Resulted from Time of ED Arrival Up to the Time of Departure from the ED   BASIC METABOLIC PANEL - Abnormal; Notable for the following:        Result Value    Creatinine 1.29 (*)     GFR Estimate 59 (*)     All other components within normal limits   CBC WITH PLATELETS DIFFERENTIAL - Abnormal; Notable for the following:     WBC 11.1 (*)     Absolute Neutrophil 9.3 (*)     All other components within normal limits   PERIPHERAL IV CATHETER   .   Treatments provided: zosyn and vancomycin  Family Comments: Pt here by himself  OBS brochure/video discussed/provided to patient:  N/A  ED Medications:   Medications   piperacillin-tazobactam (ZOSYN) infusion 3.375 " g (3.375 g Intravenous New Bag 10/3/18 7029)   vancomycin (VANCOCIN) 1,250 mg in sodium chloride 0.9 % 250 mL intermittent infusion (not administered)     Drips infusing:  Yes  For the majority of the shift, the patient's behavior Green. Interventions performed were n/a.     Severe Sepsis OR Septic Shock Diagnosis Present: No      ED Nurse Name/Phone Number: Fabienne Walker,   4:41 PM  RECEIVING UNIT ED HANDOFF REVIEW    Above ED Nurse Handoff Report was reviewed: Yes  Reviewed by: Karen M. Lesch on October 3, 2018 at 6:44 PM

## 2018-10-03 NOTE — H&P
[facility]    History and Physical  Hospitalist       Date of Admission:  10/3/2018    Chief Complaint  : Left finger wound    History of Present Illness   47yoM with recent history of multiple SSTIs at different anatomical locations as follows.  He initally presented to the ED on 9/17 for evaluation of perianal fissure/abscess and noted incidentally to have left-sided suprapubic abscess which was incised at that time.  He returned to the ED on 9/18 for further incision and drainage of the suprapubic abscess as it had coalesced and become more indurated.  He then developed redness, tenderness, swelling and warmth of the left 4th finger on 10/1, and was started on Augmentin and Bactrim for cellulitis.  He presents today out of concern that this finger infection has progressed to abscess, also has another abscess on the right inner thigh and some satellite lesions on the abdomen and legs that appear to be folliculitis.  Both the left hand and right inner thigh abscesses were incised and and drained in the ED, only the thigh wound fluid was sent for culture. He denies any history of immunodeficiency or suppression.  Currently afebrile with mild leukocytosis.  Elevated creatinine noted incidentally.        Assessment & Plan     1) Left finger abscess:   - Continue Zosyn and Vancomycin initiated in the ED after incision  - Orthopedics consult to rule out tenosynovitis; XR unrevealing  - Tylenol and tramadol ordered for pain control  - Check MRSA swab  - WOCN consult, t/c outpatient dermatology eval?    2) Right thigh abscess:  - Continue with antibiotic regimen as above  - Incised and packed with iodine ribbon in ED  - Follow up wound culture    3) ANTONIA?:   - May be spurious Cr elevation given recent Bactrim use  - Give 1L NS bolus now and trend BMP        DVT Prophylaxis: Low Risk/Ambulatory with no VTE prophylaxis indicated and Pneumatic Compression Devices  Code Status: Full Code  Disposition Plan    Expected discharge: Tomorrow, recommended to prior living arrangement once antibiotic plan established.     Entered: Miguel Forrest MD 10/03/2018, 5:30 PM   Information in the above section will display in the discharge planner report.      Miguel Forrest MD    History is obtained from the patient    Primary Care Physician   Juju Victor    Past Medical History    I have reviewed this patient's medical history and updated it with pertinent information if needed.   Past Medical History:   Diagnosis Date     Abscess      Cellulitis        Past Surgical History   I have reviewed this patient's surgical history and updated it with pertinent information if needed.  History reviewed. No pertinent surgical history.    Prior to Admission Medications   Prior to Admission Medications   Prescriptions Last Dose Informant Patient Reported? Taking?   amoxicillin-clavulanate (AUGMENTIN) 875-125 MG per tablet 10/3/2018 at am  No Yes   Sig: Take 1 tablet by mouth 2 times daily   chlorhexidine (HIBICLENS) 4 % liquid 10/3/2018 at 1400  No Yes   Sig: Apply topically daily as needed for wound care May use for bathing wounds   mupirocin (BACTROBAN) 2 % ointment 10/2/2018 at Unknown time  No Yes   Sig: Apply topically 2 times daily for 7 days   sulfamethoxazole-trimethoprim (BACTRIM DS/SEPTRA DS) 800-160 MG per tablet 10/3/2018 at 0700  No Yes   Sig: Take 1 tablet by mouth 2 times daily      Facility-Administered Medications: None     Allergies   Allergies   Allergen Reactions     No Known Drug Allergies        Social History   I have reviewed this patient's social history and updated it with pertinent information if needed. Justice ALPHONSE Person  reports that he has never smoked. He has never used smokeless tobacco. He reports that he drinks alcohol. He reports that he does not use illicit drugs. Works as a teacher.      Family History   I have reviewed this patient's family history and updated it with pertinent information if needed.    Family History   Problem Relation Age of Onset     Diabetes Maternal Grandmother      Cancer - colorectal No family hx of      Prostate Cancer No family hx of        Review of Systems   The 10 point Review of Systems is negative other than noted in the HPI.    Physical Exam   Temp: 98.2  F (36.8  C) Temp src: Oral BP: 137/79 Pulse: 92   Resp: 20 SpO2: 94 % O2 Device: None (Room air)    Vital Signs with Ranges  Temp:  [98.2  F (36.8  C)] 98.2  F (36.8  C)  Pulse:  [92] 92  Resp:  [20] 20  BP: (137)/(79) 137/79  SpO2:  [94 %] 94 %  195 lbs 0 oz    Constitutional: NAD, AAox3  Respiratory: CTA, non-labored  Cardiovascular: Regular, no MRG  GI: Soft, NT/ND  Skin: Left 4th finger abscess and R thigh abscess both s/p I&D, several smaller areas of folliculitis on abdomen and legs  Musculoskeletal: Normal  Neurologic: CN grossly intact   Psychiatric: Normal affect    Data   Data reviewed today:  I personally reviewed the hand XR image(s) showing normal.    Recent Labs  Lab 10/03/18  1536   WBC 11.1*   HGB 16.7   MCV 89         POTASSIUM 4.1   CHLORIDE 105   CO2 27   BUN 16   CR 1.29*   ANIONGAP 7   SINGH 9.6   GLC 86       Recent Results (from the past 24 hour(s))   Fingers XR, 2-3 views, left    Narrative    LEFT FINGER TWO OR MORE VIEWS   10/3/2018 3:42 PM     HISTORY: Evaluate for infection.     COMPARISON: None.      Impression    IMPRESSION: No evidence of acute fracture or subluxation. No lytic or  destructive lesions. Prominent soft tissue swelling dorsal aspect of  fourth finger.    HENNA SOTO MD

## 2018-10-03 NOTE — ED PROVIDER NOTES
History     Chief Complaint:  Cellulitis    The history is provided by the patient.      Justice Person is a 47 year old male with a history of cellulitis who presents to the emergency department for evaluation of left finger cellulitis. Four days ago, the patient reports noticing a small red bump on her left ring finger. The top came off in the shower, scabbed over, and then proceeded to swell up and become red around the left ring finger, middle phalange joint. He was seen in urgent care two days ago and is on two different antibiotics (Augmentin and Bactrim) without improvement after 48 hours. This increasing pain in the left ring finger, pain now in his left finger knuckle, throbbing at night, increasing swelling, and redness prompted the patient to seek evaluation here in the emergency department.    Here, he continues to complain of the finger infection. He also reports he has two small red bumps on her right upper thigh/groin area, which seem to be in the initial stages of what he finger started off as. Of note, he has had several SSTI over the last few months which have required abscess drainage and antibiotic use.  He is being followed by colorectal surgery for possible abscess.  He denies fevers, chills, nausea, vomiting. Denies known history of MRSA.    Allergies:  NKDA     Medications:    Allegra  Bactrim  Nasonex  Bactroban  Norco  Hibiclens  Augmentin     Past Medical History:    Cellulitis    Past Surgical History:    The patient does not have any pertinent past surgical history.    Family History:    diabetes mellitus  Denies history of prostate cancer and colorectal cancer    Social History:  Negative for tobacco use.  Positive for alcohol use  Marital Status:        Review of Systems   Constitutional: Negative for fever.   Gastrointestinal: Negative for vomiting.   Skin: Positive for color change and wound.   All other systems reviewed and are negative.    Physical Exam     Patient Vitals  "for the past 24 hrs:   BP Temp Temp src Pulse Resp SpO2 Height Weight   10/03/18 1446 137/79 98.2  F (36.8  C) Oral 92 20 94 % 1.778 m (5' 10\") 88.5 kg (195 lb)       Physical Exam  Constitutional: Alert, attentive, GCS 15.    HENT: Mucous membranes are moist.   Eyes: EOM are normal. PERRLA. Conjunctiva pink, no scleral icterus or conjunctival injection  CV: regular rate and rhythm; no murmurs, rubs or gallups.  Radial, dorsalis pedis and posterior tibial pulses 2+ bilaterally.  Cap refill <2 seconds.  Respiratory: Effort normal. Lungs clear to auscultation bilaterally. No crackles/rubs/wheezes.  Good air movement.  GI:  There is no tenderness; rebound or guarding. No distension. Normal bowel sounds.  MSK: Left index finger: diffuse erythema, heat and edema over dorsum aspect of PIP and middle phalanx. Area of fluctuance over middle phalanx. No circumferential findings. Full ROM of finger without increased pain. Cap refill in fingertip <2 seconds Remainder of hand non-tender. Right groin: Area of fluctuance surrounded by erythema.  Erythema extends drastically beyond borders that were drawn on last night.    Neurological: Alert, attentive.  Skin: Skin is warm and dry.  No rashes or petechiae.  Psychiatric: Normal affect.      Emergency Department Course   Imaging:  Radiographic findings were communicated with the patient who voiced understanding of the findings.    XR Fingers, Left, 2-3 Views:  No evidence of acute fracture or subluxation. No lytic or  destructive lesions. Prominent soft tissue swelling dorsal aspect of  fourth finger. As per radiology.     Laboratory:  BMP: GFR 59 (L), Creatinine 1.29 (H), o/w WNL  CBC: WBC: 11.1 (H), HGB: 16.7, PLT: 333    Procedures:    Procedure: Incision and Drainage   LOCATIONS:  Left index finger     ANESTHESIA:  Digital field block using Lidocaine 1% plain, total of 3 mLs     PREPARATION:  Cleansed with Betadine     PROCEDURE:  Area was incised with # 11 Blade (Sharp Point) " with a Single Straight incision.  Wound treatment included Purulent Drainage and Expression of Material.  Packing consisted of No Packing.  Appropriate dressing was applied to cover the area.    Patient Status:        Patient tolerated the procedure well. There were no complications.        Procedure: Incision and Drainage   LOCATIONS:  Right groin     ANESTHESIA:  Local field block using Lidocaine 1% plain, total of 4 mLs     PREPARATION:  Cleansed with Betadine     PROCEDURE:  Area was incised with # 11 Blade (Sharp Point) with a Single Straight incision.  Wound treatment included Deloculation, Purulent Drainage and Expression of Material.  Packing consisted of Iodoform Gauze.  Appropriate dressing was applied to cover the area.    Patient Status:        Patient tolerated the procedure well. There were no complications    Interventions:  1627 Zosyn 3.375 g IV  1700 Vancomycin 1250 mg IV   1811 NS 1L IV  1815 Tramadol 50 mg PO    Emergency Department Course:  1500 Nursing notes and vitals reviewed.  I performed an exam of the patient as documented above.     IV inserted. Medicine administered as documented above. Blood drawn. This was sent to the lab for further testing, results above.    The patient was sent for a left fingers xray while in the emergency department, findings above.     1551 I consulted with JENNIFER Small, Orthopedics, regarding the patient's history and presentation here in the emergency department.    1645 I rechecked the patient and discussed the results of his workup thus far.     The patient had the infected areas drained while here in the emergency department, see procedure notes above.     1721  I consulted with Dr. Forrest of the hospitalist services. They are in agreement to accept the patient for admission.    1805 I spoke with Dr. Forrest here in the emergency department after he saw the patient at the bedside.    Findings and plan explained to the Patient who consents to admission.  Discussed the patient with Dr. Forrest, who will admit the patient to an observation bed for further monitoring, evaluation, and treatment.    Impression & Plan    Medical Decision Making:  Justice Person is a 47 year old male who presents for evaluation of multiple recent abscesses and cellulitis as outlined above.  At this point, he requires admission for IV antibiotics as he has failed outpatient management giving quickly expanding cellulitis after 48 hours of oral antibiotics.  Abscesses were drained as noted above and was sent for culture.  He was started on IV vancomycin and Zosyn.  Blood work revealed an elevated creatinine.  He was given a bolus of saline while in the ED.  Spoke with Ortho regarding abscess and cellulitis on finger.  At this point it does not appeared to be septic arthritis or infectious tenosynovitis.  However, Ortho will follow closely while hospitalized.  There appears to be no complications of cellulitis at this point including sepsis, septic shock, or necrotizing fasciitis.  All this was explained to patient who agrees with plan for observation admission  Spoke with Dr. Forrest who will admit patient for further workup and management.  Patient was staffed with MD Jacobs.      Critical Care time:  none    Diagnosis:    ICD-10-CM    1. Cellulitis of finger of left hand L03.012    2.  3  4 Cellulitis of groin, right  Elevated Creatinine  Abscess L03.314        Disposition:  Admitted to Dr. Forrest to an observation bed     Scribe Disclosure:   I, Whit Stanley, am serving as a scribe on 10/3/2018 at 2:57 PM to personally document services performed by Reyna Alba APRN based on my observations and the provider's statements to me.     Whit Stanley  10/3/2018   [facility] EMERGENCY DEPARTMENT       Reyna Alba APRN CNP  10/03/18 2145

## 2018-10-03 NOTE — IP AVS SNAPSHOT
[facility]    [address]    [facility] [address]    Phone:  [phone]    Fax:  [phone]                                       After Visit Summary   10/3/2018    Justice Person    MRN: [id number]           After Visit Summary Signature Page     I have received my discharge instructions, and my questions have been answered. I have discussed any challenges I see with this plan with the nurse or doctor.    ..........................................................................................................................................  Patient/Patient Representative Signature      ..........................................................................................................................................  Patient Representative Print Name and Relationship to Patient    ..................................................               ................................................  Date                                   Time    ..........................................................................................................................................  Reviewed by Signature/Title    ...................................................              ..............................................  Date                                               Time          22EPIC Rev 08/18

## 2018-10-03 NOTE — ED PROVIDER NOTES
Emergency Department Attending Supervision Note  10/3/2018  3:28 PM      I evaluated this patient in conjunction with PJ Hughes CNP    Briefly, the patient presented with abscess/cellulitis over left dorsal index finger PIP and right groin region.  He is failing outpatient antibiotics.    On my exam, there is redness and swelling and fluctuance over the areas mentioned above.  Still able to range the left index finger PIP without much issue.    My impression is cellulitis and abscess of the aforementioned areas.  I have a lower suspicion for septic arthritis of the left index finger PIP joint.  Reyna Alba NP performed bedside I&D of the 2 areas of abscess and administered broad-spectrum antibiotics.  Patient will be admitted to ensure improvement of the soft tissue infection and failure of outpatient antibiotics.  Reyna discussed the case with orthopedics who will see the patient as inpatient.    Diagnosis    ICD-10-CM    1. Cellulitis of finger of left hand L03.012    2. Cellulitis of groin, right L03.314        Geoff Jacobs MD Lindenbaum, Elan, MD  10/04/18 0109

## 2018-10-04 ENCOUNTER — ANESTHESIA EVENT (OUTPATIENT)
Dept: SURGERY | Facility: CLINIC | Age: 48
End: 2018-10-04
Payer: COMMERCIAL

## 2018-10-04 ENCOUNTER — ANESTHESIA (OUTPATIENT)
Dept: SURGERY | Facility: CLINIC | Age: 48
End: 2018-10-04
Payer: COMMERCIAL

## 2018-10-04 LAB
ANION GAP SERPL CALCULATED.3IONS-SCNC: 5 MMOL/L (ref 3–14)
BASOPHILS # BLD AUTO: 0 10E9/L (ref 0–0.2)
BASOPHILS NFR BLD AUTO: 0.5 %
BUN SERPL-MCNC: 14 MG/DL (ref 7–30)
CALCIUM SERPL-MCNC: 8.6 MG/DL (ref 8.5–10.1)
CHLORIDE SERPL-SCNC: 107 MMOL/L (ref 94–109)
CO2 SERPL-SCNC: 27 MMOL/L (ref 20–32)
CREAT SERPL-MCNC: 1.38 MG/DL (ref 0.66–1.25)
CREAT UR-MCNC: 34 MG/DL
DIFFERENTIAL METHOD BLD: NORMAL
EOSINOPHIL # BLD AUTO: 0.2 10E9/L (ref 0–0.7)
EOSINOPHIL NFR BLD AUTO: 2.7 %
ERYTHROCYTE [DISTWIDTH] IN BLOOD BY AUTOMATED COUNT: 12.8 % (ref 10–15)
FRACT EXCRET NA UR+SERPL-RTO: 0.9 %
GFR SERPL CREATININE-BSD FRML MDRD: 55 ML/MIN/1.7M2
GLUCOSE SERPL-MCNC: 88 MG/DL (ref 70–99)
HCT VFR BLD AUTO: 46.5 % (ref 40–53)
HGB BLD-MCNC: 15.6 G/DL (ref 13.3–17.7)
IMM GRANULOCYTES # BLD: 0 10E9/L (ref 0–0.4)
IMM GRANULOCYTES NFR BLD: 0.3 %
LYMPHOCYTES # BLD AUTO: 1 10E9/L (ref 0.8–5.3)
LYMPHOCYTES NFR BLD AUTO: 13.4 %
MAGNESIUM SERPL-MCNC: 2.2 MG/DL (ref 1.6–2.3)
MCH RBC QN AUTO: 30.2 PG (ref 26.5–33)
MCHC RBC AUTO-ENTMCNC: 33.5 G/DL (ref 31.5–36.5)
MCV RBC AUTO: 90 FL (ref 78–100)
MONOCYTES # BLD AUTO: 0.5 10E9/L (ref 0–1.3)
MONOCYTES NFR BLD AUTO: 6.7 %
MRSA DNA SPEC QL NAA+PROBE: NEGATIVE
NEUTROPHILS # BLD AUTO: 5.9 10E9/L (ref 1.6–8.3)
NEUTROPHILS NFR BLD AUTO: 76.4 %
NRBC # BLD AUTO: 0 10*3/UL
NRBC BLD AUTO-RTO: 0 /100
PLATELET # BLD AUTO: 305 10E9/L (ref 150–450)
POTASSIUM SERPL-SCNC: 4.2 MMOL/L (ref 3.4–5.3)
RBC # BLD AUTO: 5.17 10E12/L (ref 4.4–5.9)
SODIUM SERPL-SCNC: 139 MMOL/L (ref 133–144)
SODIUM UR-SCNC: 32 MMOL/L
SPECIMEN SOURCE: NORMAL
WBC # BLD AUTO: 7.8 10E9/L (ref 4–11)

## 2018-10-04 PROCEDURE — 25000128 H RX IP 250 OP 636: Performed by: ANESTHESIOLOGY

## 2018-10-04 PROCEDURE — 71000012 ZZH RECOVERY PHASE 1 LEVEL 1 FIRST HR: Performed by: ORTHOPAEDIC SURGERY

## 2018-10-04 PROCEDURE — 85025 COMPLETE CBC W/AUTO DIFF WBC: CPT | Performed by: INTERNAL MEDICINE

## 2018-10-04 PROCEDURE — 25000132 ZZH RX MED GY IP 250 OP 250 PS 637: Performed by: ORTHOPAEDIC SURGERY

## 2018-10-04 PROCEDURE — 87077 CULTURE AEROBIC IDENTIFY: CPT | Performed by: ORTHOPAEDIC SURGERY

## 2018-10-04 PROCEDURE — 36000052 ZZH SURGERY LEVEL 2 EA 15 ADDTL MIN: Performed by: ORTHOPAEDIC SURGERY

## 2018-10-04 PROCEDURE — 99232 SBSQ HOSP IP/OBS MODERATE 35: CPT | Performed by: INTERNAL MEDICINE

## 2018-10-04 PROCEDURE — 36415 COLL VENOUS BLD VENIPUNCTURE: CPT | Performed by: INTERNAL MEDICINE

## 2018-10-04 PROCEDURE — 82570 ASSAY OF URINE CREATININE: CPT | Performed by: INTERNAL MEDICINE

## 2018-10-04 PROCEDURE — 87070 CULTURE OTHR SPECIMN AEROBIC: CPT | Performed by: ORTHOPAEDIC SURGERY

## 2018-10-04 PROCEDURE — 37000008 ZZH ANESTHESIA TECHNICAL FEE, 1ST 30 MIN: Performed by: ORTHOPAEDIC SURGERY

## 2018-10-04 PROCEDURE — 25000128 H RX IP 250 OP 636: Performed by: INTERNAL MEDICINE

## 2018-10-04 PROCEDURE — 37000009 ZZH ANESTHESIA TECHNICAL FEE, EACH ADDTL 15 MIN: Performed by: ORTHOPAEDIC SURGERY

## 2018-10-04 PROCEDURE — 25000132 ZZH RX MED GY IP 250 OP 250 PS 637: Performed by: INTERNAL MEDICINE

## 2018-10-04 PROCEDURE — 87186 SC STD MICRODIL/AGAR DIL: CPT | Performed by: ORTHOPAEDIC SURGERY

## 2018-10-04 PROCEDURE — 40000306 ZZH STATISTIC PRE PROC ASSESS II: Performed by: ORTHOPAEDIC SURGERY

## 2018-10-04 PROCEDURE — 80048 BASIC METABOLIC PNL TOTAL CA: CPT | Performed by: INTERNAL MEDICINE

## 2018-10-04 PROCEDURE — 25000125 ZZHC RX 250: Performed by: NURSE ANESTHETIST, CERTIFIED REGISTERED

## 2018-10-04 PROCEDURE — 84300 ASSAY OF URINE SODIUM: CPT | Performed by: INTERNAL MEDICINE

## 2018-10-04 PROCEDURE — 27210794 ZZH OR GENERAL SUPPLY STERILE: Performed by: ORTHOPAEDIC SURGERY

## 2018-10-04 PROCEDURE — 0JBK0ZZ EXCISION OF LEFT HAND SUBCUTANEOUS TISSUE AND FASCIA, OPEN APPROACH: ICD-10-PCS | Performed by: ORTHOPAEDIC SURGERY

## 2018-10-04 PROCEDURE — 36000050 ZZH SURGERY LEVEL 2 1ST 30 MIN: Performed by: ORTHOPAEDIC SURGERY

## 2018-10-04 PROCEDURE — 87075 CULTR BACTERIA EXCEPT BLOOD: CPT | Performed by: ORTHOPAEDIC SURGERY

## 2018-10-04 PROCEDURE — 25000566 ZZH SEVOFLURANE, EA 15 MIN: Performed by: ORTHOPAEDIC SURGERY

## 2018-10-04 PROCEDURE — 25000128 H RX IP 250 OP 636: Performed by: NURSE ANESTHETIST, CERTIFIED REGISTERED

## 2018-10-04 PROCEDURE — 97602 WOUND(S) CARE NON-SELECTIVE: CPT

## 2018-10-04 PROCEDURE — 12000007 ZZH R&B INTERMEDIATE

## 2018-10-04 PROCEDURE — 99252 IP/OBS CONSLTJ NEW/EST SF 35: CPT | Performed by: SURGERY

## 2018-10-04 PROCEDURE — 25000128 H RX IP 250 OP 636: Performed by: ORTHOPAEDIC SURGERY

## 2018-10-04 PROCEDURE — 83735 ASSAY OF MAGNESIUM: CPT | Performed by: INTERNAL MEDICINE

## 2018-10-04 PROCEDURE — G0463 HOSPITAL OUTPT CLINIC VISIT: HCPCS | Mod: 25

## 2018-10-04 RX ORDER — DEXAMETHASONE SODIUM PHOSPHATE 4 MG/ML
4 INJECTION, SOLUTION INTRA-ARTICULAR; INTRALESIONAL; INTRAMUSCULAR; INTRAVENOUS; SOFT TISSUE
Status: DISCONTINUED | OUTPATIENT
Start: 2018-10-04 | End: 2018-10-04 | Stop reason: HOSPADM

## 2018-10-04 RX ORDER — OXYCODONE HYDROCHLORIDE 5 MG/1
5-10 TABLET ORAL
Status: DISCONTINUED | OUTPATIENT
Start: 2018-10-04 | End: 2018-10-06 | Stop reason: HOSPADM

## 2018-10-04 RX ORDER — HYDRALAZINE HYDROCHLORIDE 20 MG/ML
2.5-5 INJECTION INTRAMUSCULAR; INTRAVENOUS EVERY 10 MIN PRN
Status: DISCONTINUED | OUTPATIENT
Start: 2018-10-04 | End: 2018-10-04 | Stop reason: HOSPADM

## 2018-10-04 RX ORDER — IBUPROFEN 600 MG/1
600 TABLET, FILM COATED ORAL EVERY 6 HOURS PRN
Status: DISCONTINUED | OUTPATIENT
Start: 2018-10-04 | End: 2018-10-06 | Stop reason: HOSPADM

## 2018-10-04 RX ORDER — AMOXICILLIN 250 MG
2 CAPSULE ORAL 2 TIMES DAILY
Status: DISCONTINUED | OUTPATIENT
Start: 2018-10-04 | End: 2018-10-06 | Stop reason: HOSPADM

## 2018-10-04 RX ORDER — NALOXONE HYDROCHLORIDE 0.4 MG/ML
.1-.4 INJECTION, SOLUTION INTRAMUSCULAR; INTRAVENOUS; SUBCUTANEOUS
Status: DISCONTINUED | OUTPATIENT
Start: 2018-10-04 | End: 2018-10-04

## 2018-10-04 RX ORDER — AMOXICILLIN 250 MG
1 CAPSULE ORAL 2 TIMES DAILY
Status: DISCONTINUED | OUTPATIENT
Start: 2018-10-04 | End: 2018-10-06 | Stop reason: HOSPADM

## 2018-10-04 RX ORDER — KETOROLAC TROMETHAMINE 30 MG/ML
30 INJECTION, SOLUTION INTRAMUSCULAR; INTRAVENOUS
Status: DISCONTINUED | OUTPATIENT
Start: 2018-10-04 | End: 2018-10-04 | Stop reason: HOSPADM

## 2018-10-04 RX ORDER — FENTANYL CITRATE 50 UG/ML
INJECTION, SOLUTION INTRAMUSCULAR; INTRAVENOUS PRN
Status: DISCONTINUED | OUTPATIENT
Start: 2018-10-04 | End: 2018-10-04

## 2018-10-04 RX ORDER — PROPOFOL 10 MG/ML
INJECTION, EMULSION INTRAVENOUS PRN
Status: DISCONTINUED | OUTPATIENT
Start: 2018-10-04 | End: 2018-10-04

## 2018-10-04 RX ORDER — ONDANSETRON 2 MG/ML
INJECTION INTRAMUSCULAR; INTRAVENOUS PRN
Status: DISCONTINUED | OUTPATIENT
Start: 2018-10-04 | End: 2018-10-04

## 2018-10-04 RX ORDER — METOCLOPRAMIDE 10 MG/1
10 TABLET ORAL EVERY 6 HOURS PRN
Status: DISCONTINUED | OUTPATIENT
Start: 2018-10-04 | End: 2018-10-06 | Stop reason: HOSPADM

## 2018-10-04 RX ORDER — SODIUM CHLORIDE, SODIUM LACTATE, POTASSIUM CHLORIDE, CALCIUM CHLORIDE 600; 310; 30; 20 MG/100ML; MG/100ML; MG/100ML; MG/100ML
INJECTION, SOLUTION INTRAVENOUS CONTINUOUS
Status: DISCONTINUED | OUTPATIENT
Start: 2018-10-04 | End: 2018-10-04 | Stop reason: HOSPADM

## 2018-10-04 RX ORDER — PROCHLORPERAZINE MALEATE 5 MG
10 TABLET ORAL EVERY 6 HOURS PRN
Status: DISCONTINUED | OUTPATIENT
Start: 2018-10-04 | End: 2018-10-06 | Stop reason: HOSPADM

## 2018-10-04 RX ORDER — ONDANSETRON 2 MG/ML
4 INJECTION INTRAMUSCULAR; INTRAVENOUS EVERY 6 HOURS PRN
Status: DISCONTINUED | OUTPATIENT
Start: 2018-10-04 | End: 2018-10-06 | Stop reason: HOSPADM

## 2018-10-04 RX ORDER — LIDOCAINE 40 MG/G
CREAM TOPICAL
Status: DISCONTINUED | OUTPATIENT
Start: 2018-10-04 | End: 2018-10-06 | Stop reason: HOSPADM

## 2018-10-04 RX ORDER — HYDROMORPHONE HYDROCHLORIDE 1 MG/ML
.3-.5 INJECTION, SOLUTION INTRAMUSCULAR; INTRAVENOUS; SUBCUTANEOUS EVERY 5 MIN PRN
Status: DISCONTINUED | OUTPATIENT
Start: 2018-10-04 | End: 2018-10-04 | Stop reason: HOSPADM

## 2018-10-04 RX ORDER — SODIUM CHLORIDE, SODIUM LACTATE, POTASSIUM CHLORIDE, CALCIUM CHLORIDE 600; 310; 30; 20 MG/100ML; MG/100ML; MG/100ML; MG/100ML
INJECTION, SOLUTION INTRAVENOUS CONTINUOUS
Status: DISCONTINUED | OUTPATIENT
Start: 2018-10-04 | End: 2018-10-05

## 2018-10-04 RX ORDER — DEXAMETHASONE SODIUM PHOSPHATE 4 MG/ML
INJECTION, SOLUTION INTRA-ARTICULAR; INTRALESIONAL; INTRAMUSCULAR; INTRAVENOUS; SOFT TISSUE PRN
Status: DISCONTINUED | OUTPATIENT
Start: 2018-10-04 | End: 2018-10-04

## 2018-10-04 RX ORDER — GLYCOPYRROLATE 0.2 MG/ML
INJECTION, SOLUTION INTRAMUSCULAR; INTRAVENOUS PRN
Status: DISCONTINUED | OUTPATIENT
Start: 2018-10-04 | End: 2018-10-04

## 2018-10-04 RX ORDER — LIDOCAINE HYDROCHLORIDE 10 MG/ML
INJECTION, SOLUTION INFILTRATION; PERINEURAL PRN
Status: DISCONTINUED | OUTPATIENT
Start: 2018-10-04 | End: 2018-10-04

## 2018-10-04 RX ORDER — HYDROMORPHONE HYDROCHLORIDE 1 MG/ML
.3-.5 INJECTION, SOLUTION INTRAMUSCULAR; INTRAVENOUS; SUBCUTANEOUS
Status: DISCONTINUED | OUTPATIENT
Start: 2018-10-04 | End: 2018-10-06 | Stop reason: HOSPADM

## 2018-10-04 RX ORDER — SODIUM CHLORIDE 9 MG/ML
INJECTION, SOLUTION INTRAVENOUS CONTINUOUS
Status: DISCONTINUED | OUTPATIENT
Start: 2018-10-04 | End: 2018-10-04 | Stop reason: ALTCHOICE

## 2018-10-04 RX ORDER — LIDOCAINE 40 MG/G
CREAM TOPICAL
Status: DISCONTINUED | OUTPATIENT
Start: 2018-10-04 | End: 2018-10-04 | Stop reason: HOSPADM

## 2018-10-04 RX ORDER — ACETAMINOPHEN 325 MG/1
975 TABLET ORAL EVERY 8 HOURS
Status: DISCONTINUED | OUTPATIENT
Start: 2018-10-04 | End: 2018-10-06 | Stop reason: HOSPADM

## 2018-10-04 RX ORDER — CEFAZOLIN SODIUM 2 G/100ML
2 INJECTION, SOLUTION INTRAVENOUS
Status: DISCONTINUED | OUTPATIENT
Start: 2018-10-04 | End: 2018-10-04 | Stop reason: HOSPADM

## 2018-10-04 RX ORDER — HYDROXYZINE HYDROCHLORIDE 25 MG/1
25 TABLET, FILM COATED ORAL EVERY 6 HOURS PRN
Status: DISCONTINUED | OUTPATIENT
Start: 2018-10-04 | End: 2018-10-06 | Stop reason: HOSPADM

## 2018-10-04 RX ORDER — ONDANSETRON 2 MG/ML
4 INJECTION INTRAMUSCULAR; INTRAVENOUS EVERY 30 MIN PRN
Status: DISCONTINUED | OUTPATIENT
Start: 2018-10-04 | End: 2018-10-04 | Stop reason: HOSPADM

## 2018-10-04 RX ORDER — CEFAZOLIN SODIUM 1 G/3ML
1 INJECTION, POWDER, FOR SOLUTION INTRAMUSCULAR; INTRAVENOUS SEE ADMIN INSTRUCTIONS
Status: DISCONTINUED | OUTPATIENT
Start: 2018-10-04 | End: 2018-10-04 | Stop reason: HOSPADM

## 2018-10-04 RX ORDER — FENTANYL CITRATE 50 UG/ML
25-50 INJECTION, SOLUTION INTRAMUSCULAR; INTRAVENOUS
Status: DISCONTINUED | OUTPATIENT
Start: 2018-10-04 | End: 2018-10-04 | Stop reason: HOSPADM

## 2018-10-04 RX ORDER — ONDANSETRON 4 MG/1
4 TABLET, ORALLY DISINTEGRATING ORAL EVERY 6 HOURS PRN
Status: DISCONTINUED | OUTPATIENT
Start: 2018-10-04 | End: 2018-10-06 | Stop reason: HOSPADM

## 2018-10-04 RX ORDER — FENTANYL CITRATE 50 UG/ML
100 INJECTION, SOLUTION INTRAMUSCULAR; INTRAVENOUS ONCE
Status: COMPLETED | OUTPATIENT
Start: 2018-10-04 | End: 2018-10-04

## 2018-10-04 RX ORDER — DIMENHYDRINATE 50 MG/ML
25 INJECTION, SOLUTION INTRAMUSCULAR; INTRAVENOUS
Status: DISCONTINUED | OUTPATIENT
Start: 2018-10-04 | End: 2018-10-04 | Stop reason: HOSPADM

## 2018-10-04 RX ORDER — ONDANSETRON 4 MG/1
4 TABLET, ORALLY DISINTEGRATING ORAL EVERY 30 MIN PRN
Status: DISCONTINUED | OUTPATIENT
Start: 2018-10-04 | End: 2018-10-04 | Stop reason: HOSPADM

## 2018-10-04 RX ORDER — ACETAMINOPHEN 325 MG/1
650 TABLET ORAL EVERY 4 HOURS PRN
Status: DISCONTINUED | OUTPATIENT
Start: 2018-10-07 | End: 2018-10-06 | Stop reason: HOSPADM

## 2018-10-04 RX ORDER — METOCLOPRAMIDE HYDROCHLORIDE 5 MG/ML
10 INJECTION INTRAMUSCULAR; INTRAVENOUS EVERY 6 HOURS PRN
Status: DISCONTINUED | OUTPATIENT
Start: 2018-10-04 | End: 2018-10-06 | Stop reason: HOSPADM

## 2018-10-04 RX ORDER — NALOXONE HYDROCHLORIDE 0.4 MG/ML
.1-.4 INJECTION, SOLUTION INTRAMUSCULAR; INTRAVENOUS; SUBCUTANEOUS
Status: ACTIVE | OUTPATIENT
Start: 2018-10-04 | End: 2018-10-05

## 2018-10-04 RX ORDER — MEPERIDINE HYDROCHLORIDE 50 MG/ML
12.5 INJECTION INTRAMUSCULAR; INTRAVENOUS; SUBCUTANEOUS EVERY 5 MIN PRN
Status: DISCONTINUED | OUTPATIENT
Start: 2018-10-04 | End: 2018-10-04 | Stop reason: HOSPADM

## 2018-10-04 RX ORDER — LABETALOL HYDROCHLORIDE 5 MG/ML
10 INJECTION, SOLUTION INTRAVENOUS
Status: DISCONTINUED | OUTPATIENT
Start: 2018-10-04 | End: 2018-10-04 | Stop reason: HOSPADM

## 2018-10-04 RX ORDER — BUPIVACAINE HYDROCHLORIDE 5 MG/ML
INJECTION, SOLUTION PERINEURAL PRN
Status: DISCONTINUED | OUTPATIENT
Start: 2018-10-04 | End: 2018-10-04 | Stop reason: HOSPADM

## 2018-10-04 RX ADMIN — SENNOSIDES AND DOCUSATE SODIUM 1 TABLET: 8.6; 5 TABLET ORAL at 22:09

## 2018-10-04 RX ADMIN — DEXAMETHASONE SODIUM PHOSPHATE 4 MG: 4 INJECTION, SOLUTION INTRA-ARTICULAR; INTRALESIONAL; INTRAMUSCULAR; INTRAVENOUS; SOFT TISSUE at 17:02

## 2018-10-04 RX ADMIN — TRAMADOL HYDROCHLORIDE 50 MG: 50 TABLET, COATED ORAL at 00:52

## 2018-10-04 RX ADMIN — ACETAMINOPHEN 975 MG: 325 TABLET, FILM COATED ORAL at 22:08

## 2018-10-04 RX ADMIN — MIDAZOLAM 2 MG: 1 INJECTION INTRAMUSCULAR; INTRAVENOUS at 16:53

## 2018-10-04 RX ADMIN — PROPOFOL 200 MG: 10 INJECTION, EMULSION INTRAVENOUS at 17:02

## 2018-10-04 RX ADMIN — SODIUM CHLORIDE, POTASSIUM CHLORIDE, SODIUM LACTATE AND CALCIUM CHLORIDE: 600; 310; 30; 20 INJECTION, SOLUTION INTRAVENOUS at 21:45

## 2018-10-04 RX ADMIN — Medication: at 12:30

## 2018-10-04 RX ADMIN — VANCOMYCIN HYDROCHLORIDE 1500 MG: 10 INJECTION, POWDER, LYOPHILIZED, FOR SOLUTION INTRAVENOUS at 14:44

## 2018-10-04 RX ADMIN — ACETAMINOPHEN 650 MG: 325 TABLET, FILM COATED ORAL at 05:07

## 2018-10-04 RX ADMIN — FENTANYL CITRATE 100 MCG: 50 INJECTION INTRAMUSCULAR; INTRAVENOUS at 15:32

## 2018-10-04 RX ADMIN — TAZOBACTAM SODIUM AND PIPERACILLIN SODIUM 3.38 G: 375; 3 INJECTION, SOLUTION INTRAVENOUS at 22:08

## 2018-10-04 RX ADMIN — SODIUM CHLORIDE, POTASSIUM CHLORIDE, SODIUM LACTATE AND CALCIUM CHLORIDE: 600; 310; 30; 20 INJECTION, SOLUTION INTRAVENOUS at 16:53

## 2018-10-04 RX ADMIN — TAZOBACTAM SODIUM AND PIPERACILLIN SODIUM 3.38 G: 375; 3 INJECTION, SOLUTION INTRAVENOUS at 04:23

## 2018-10-04 RX ADMIN — ONDANSETRON 4 MG: 2 INJECTION INTRAMUSCULAR; INTRAVENOUS at 17:15

## 2018-10-04 RX ADMIN — GLYCOPYRROLATE 0.2 MG: 0.2 INJECTION, SOLUTION INTRAMUSCULAR; INTRAVENOUS at 17:02

## 2018-10-04 RX ADMIN — SODIUM CHLORIDE: 9 INJECTION, SOLUTION INTRAVENOUS at 12:30

## 2018-10-04 RX ADMIN — TAZOBACTAM SODIUM AND PIPERACILLIN SODIUM 3.38 G: 375; 3 INJECTION, SOLUTION INTRAVENOUS at 17:05

## 2018-10-04 RX ADMIN — SODIUM CHLORIDE: 9 INJECTION, SOLUTION INTRAVENOUS at 19:56

## 2018-10-04 RX ADMIN — ACETAMINOPHEN 650 MG: 325 TABLET, FILM COATED ORAL at 11:27

## 2018-10-04 RX ADMIN — LIDOCAINE HYDROCHLORIDE 50 MG: 10 INJECTION, SOLUTION INFILTRATION; PERINEURAL at 17:02

## 2018-10-04 RX ADMIN — FENTANYL CITRATE 100 MCG: 50 INJECTION, SOLUTION INTRAMUSCULAR; INTRAVENOUS at 17:02

## 2018-10-04 RX ADMIN — TAZOBACTAM SODIUM AND PIPERACILLIN SODIUM 3.38 G: 375; 3 INJECTION, SOLUTION INTRAVENOUS at 11:11

## 2018-10-04 ASSESSMENT — ACTIVITIES OF DAILY LIVING (ADL): ADLS_ACUITY_SCORE: 11

## 2018-10-04 ASSESSMENT — PAIN DESCRIPTION - DESCRIPTORS
DESCRIPTORS: THROBBING
DESCRIPTORS: THROBBING

## 2018-10-04 NOTE — PHARMACY-VANCOMYCIN DOSING SERVICE
Pharmacy Vancomycin Initial Note  Date of Service 2018  Patient's  1970  47 year old, male    Indication: Abscess and Skin and Soft Tissue Infection    Current estimated CrCl = Estimated Creatinine Clearance: 72.5 mL/min (based on Cr of 1.38).    Creatinine for last 3 days  10/3/2018:  3:36 PM Creatinine 1.29 mg/dL  10/4/2018:  6:24 AM Creatinine 1.38 mg/dL    Recent Vancomycin Level(s) for last 3 days  No results found for requested labs within last 72 hours.      Vancomycin IV Administrations (past 72 hours)      No vancomycin orders with administrations in past 72 hours.                Nephrotoxins and other renal medications (Future)    Start     Dose/Rate Route Frequency Ordered Stop    10/03/18 2200  piperacillin-tazobactam (ZOSYN) infusion 3.375 g      3.375 g  100 mL/hr over 30 Minutes Intravenous EVERY 6 HOURS 10/03/18 1926 10/08/18 1559          Contrast Orders - past 72 hours     None                Plan:  1.  Start vancomycin  1500 mg IV q12h.   2.  Goal Trough Level: 10-15 mg/L   3.  Pharmacy will check trough levels as appropriate in 1-3 Days.    4. Serum creatinine levels will be ordered every other day for at least 10 days while on concomitant nephrotoxins.    5. [place] method utilized to dose vancomycin therapy: Method 1    Miguel Randall

## 2018-10-04 NOTE — CONSULTS
ID consult dictated IMP1 48 yo male multiple recent acute spontaneous skin abcscesse, incl periectal area, presumably staph    REC await cx I and D of L finger, would also drain thigh better, vanco/zosyn for now

## 2018-10-04 NOTE — PROGRESS NOTES
[facility] Nurse Inpatient Wound Assessment     Assessment of wound(s) on pt's:   Finger and thigh        Data:   Patient History:      per MD note(s): 47 year old male who presents for evaluation of and area of redness, tenderness, swelling and warmth of the skin that developed on the  left, dorsal 4th finger over the middle phalanyx   . Also has a large and small area over the right anterior thigh  Patient has had pain, purulent (pus) drainage , skin erythema (reddened skin) and tenderness for 4 days.    Precipitating event was unknown,   He was evaluated in ER 2 x with incision and drainage of abcess in left groin,  And he had a leander-rectal abcess rupture 2 days before.  He is following with [facility]-rectal surgery concerning the pelvic abcesses.    Therapies tried: local cleaning.  1) Left finger abscess:   - Continue Zosyn and Vancomycin initiated in the ED after incision  - Orthopedics consult to rule out tenosynovitis; XR unrevealing  - Tylenol and tramadol ordered for pain control  - Check MRSA swab  - WOCN consult, t/c outpatient dermatology eval?     2) Right thigh abscess:  - Continue with antibiotic regimen as above  - Incised and packed with iodine ribbon in ED  - Follow up wound culture       Current Diet / Nutrition:           Active Diet Order      Combination Diet Regular Diet Adult            discussed protein for wound healing    Alo Assessment and sub scores:   Alo Score  Av.7  Min: 22  Max: 23     Labs:     Recent Labs   Lab Test  10/04/18   0624   HGB  15.6   RBC  5.17   WBC  7.8   PLT  305          Wound Assessment (location):   Multiple on abdomen/ right groin  Wound History:  Pt states that wounds began as an infected follicle and then get larger and harder with pus expressed from several areas.    Specific Dimensions (length x width x depth, in cm):       3 spots on lower abdomen- unroofed dry scab, moist pink dermis ~ 0.2 x 0.2cm    Right upper thigh- 0.5 x 1 x  "1cm small open well, moist pink tissue with small bloody drainage    Periwound Skin: erythema, and several of the areas have marble like induration,      Odor: none    Pain:  minimal , tender    Wound Assessment (location):   Left 3rd finger  Wound History:  Wound erupted and planning to go to surgery later today with Orthopedic MD    Specific Dimensions (length x width x depth, in cm):   Circumferential wound to knuckle area, red, moist, edema and tender, minimal serous drainage           Intervention:     Patient's chart evaluated.      Wound(s) were assessed    Wound Care: was done:  Per POC    Orders  Written and discussed with Dr Maciel    Supplies  ordered: per POC, gathered, placed at the bedside and discussed with RN    Discussed plan of care with Patient, Family, Nurse and Physician          Assessment:       Multiple areas of skin infection from unknown source. Pt admits to going to [employer] and wearing compression garments 5 days per week. He is also a  working in Middle school.   Several wounds have been lanced and drained but pt is also having new eruptions to right arm and left thigh.   Recommend to use Dakin's solution and Iodosorb gel to help to debride and clean the wound.   Left ring finger is followed by ortho MD who plans for surgery later today.          Plan:     Nursing to notify the Provider(s) and re-consult the [facility] Nurse if wound(s) deteriorate(s) or if the wound care plan needs reevaluation.    Plan of care for wound located on abdomen and Right Groin: Daily    1. Shower and rinse well    2. Rinse all open wounds well with wound spray, pat dry    3. Lightly moisten 2\" of 1/4\" strip gauze with Dakin's solution and tuck into the wound bed- leave a tail outside for retrieval next dressing change. Cover with gauze     4. Apply pea amount of Iodsorb gel to band aid and apply to all boils or wounds on trunk.    WO Nurse will return: weekly/ PRN            "

## 2018-10-04 NOTE — PLAN OF CARE
Problem: Patient Care Overview  Goal: Discharge Needs Assessment  Outcome: Improving  PRIMARY DIAGNOSIS: SOFT TISSUE INFECTIONS  OUTPATIENT/OBSERVATION GOALS TO BE MET BEFORE DISCHARGE:  1. Vitals sign stable or return to baseline: Yes    2. Tolerating oral antibiotics or has home infusion set up if applicable: IV abx orders currently    3. Pain status: Improved-controlled with oral pain medications.    4. Return to near baseline physical activity: Yes    Discharge Planner Nurse   Safe discharge environment identified: Yes  Barriers to discharge: Yes, awaiting completion of IV abx, WOC and Ortho consults       Entered by: Arleth Larsen 10/04/2018 6:00 AM     Please review provider order for any additional goals.     Nurse to notify provider when observation goals have been met and patient is ready for discharge.    VSS on RA. Continues to c/o pain to L 4th finger, improved with tramadol per pt, additional tylenol given. Continues to void without saving. PIV SL with IV zosyn as ordered. Awaiting consultation from ortho and WOC. Alert and oriented, able to make needs known. Continue to monitor.

## 2018-10-04 NOTE — PROGRESS NOTES
[facility]  Hospitalist Progress Note  Harmony Maciel MD 10/04/18  Text Page  Pager: [phone] (7am-6pm)    Reason for Stay (Diagnosis): Abscesses         Assessment and Plan:      Summary of Stay: Justice Person is a 47 year old male with no significant past medical history other than recent multiple skin abscesses who was admitted on 10/3/2018 with right thigh and left fourth finger abscess despite recent treatment with antibiotics and was also found to have ANTONIA.  Patient will be going to OR today to for I&D of finger.    Problem List/Assessment and Plan:     1. Left 4th Finger Abscess: Started on Vancomycin/Zosyn in the ER which has been continued.  Orthopedics consulted and patient will be going to OR today.  - Continue Vancomycin/Zosyn  - Orthopedics consulted, appreciate recommendations  - Pain control with Tylenol, Tramadol as needed  - NPO for surgery    2. Right Thigh Abscess: S/p I&D in the ER.  Still with surrounding erythema.  On antibiotics as above.  ID also consulted.  - Continue Vancomycin/Zosyn  - ID consulted, appreciate recommendations  - Surgery consulted - expect this will also need repeat drainage    3. ANTONIA: Creatinine previously had been 0.94, elevated to 1.29 then 1.38 this morning.  FENA 0.9.  Could be pre renal, will treat with IVF.  Also had recent Bactrim use so could have ATN.  - IVF and repeat BMP in the AM    DVT Prophylaxis: Pneumatic Compression Devices  Code Status: Full Code  Discharge Dispo: Home  Estimated Disch Date / # of Days until Disch: TBD based on surgical procedures and culture results        Interval History (Subjective):      Patient states he is feeling well this morning.  Has some pain in the finger but normal sensation.  Denies nausea, emesis, abdominal pain, CP, SOB.  Before September never had issues with abscesses.                  Physical Exam:      Last Vital Signs:  /75 (BP Location: Right arm)  Pulse 56  Temp 97.1  F (36.2  C)  "(Oral)  Resp 16  Ht 1.778 m (5' 10\")  Wt 88.5 kg (195 lb)  SpO2 97%  BMI 27.98 kg/m2    General: Alert, awake, no acute distress.  HEENT: Normocephalic and atraumatic, eyes anicteric and without scleral injection, EOMI, face symmetric, MMM.  Cardiac: RRR, normal S1, S2. No m/g/r, no LE edema.  Pulmonary: Normal chest rise, normal work of breathing.  Lungs CTAB without crackles or wheezing.  Abdomen: soft, non-tender, non-distended.  Normoactive bowel sounds, no guarding or rebound tenderness.  Extremities: no deformities.  Warm, well perfused.  Skin: Left 4th finger with dressing in place which is c/d/i, abscess of right thigh s/p I&D with packing in place - surrounding erythema has decreased from marked borders.  Several areas of folliculitis on the abdomen and legs.  Warm and Dry.  Neuro: No focal deficits.  Speech clear.  Coordination and strength grossly normal.  Psych: Alert and oriented x3. Appropriate affect.         Medications:      All current medications were reviewed with changes reflected in problem list.         Data:      All new lab and imaging data was reviewed.   Labs:    Recent Labs  Lab 10/04/18  0624 10/03/18  1536    139   POTASSIUM 4.2 4.1   CHLORIDE 107 105   CO2 27 27   ANIONGAP 5 7   GLC 88 86   BUN 14 16   CR 1.38* 1.29*   GFRESTIMATED 55* 59*   GFRESTBLACK 66 71   SINGH 8.6 9.6       Recent Labs  Lab 10/04/18  0624 10/03/18  1536   WBC 7.8 11.1*   HGB 15.6 16.7   HCT 46.5 49.0   MCV 90 89    333      Imaging:   Recent Results (from the past 24 hour(s))   Fingers XR, 2-3 views, left    Narrative    LEFT FINGER TWO OR MORE VIEWS   10/3/2018 3:42 PM     HISTORY: Evaluate for infection.     COMPARISON: None.      Impression    IMPRESSION: No evidence of acute fracture or subluxation. No lytic or  destructive lesions. Prominent soft tissue swelling dorsal aspect of  fourth finger.    MD Harmony SHAW MD.         "

## 2018-10-04 NOTE — PLAN OF CARE
Problem: Patient Care Overview  Goal: Plan of Care/Patient Progress Review  ROOM # 231    Living Situation  Lives independently in the community with spouse  Facility name:  [facility]  :  Barbara Doyle, spouse  [phone]    Activity level at baseline:  Independent  Activity level on admit: Independent    Patient registered to observation; given Patient Bill of Rights; given the opportunity to ask questions about observation status and their plan of care.  Patient has been oriented to the observation room, bathroom and call light is in place.    Discussed discharge goals and expectations with patient/family.

## 2018-10-04 NOTE — CONSULTS
General surgery consult    I was asked to see this 47-year-old gentleman regarding a right thigh abscess.  This is a patient with no significant past medical history who has developed multiple abscesses at various locations on his body over the last month or so.  The patient is going to the operating room this afternoon for incision and debridement of a left finger abscess.  The patient was seen by infectious disease, and it was felt that he might need further debridement of his right thigh abscess.  The patient is currently feeling well.    Past Medical History:   Diagnosis Date     Abscess      Cellulitis      History reviewed. No pertinent surgical history.  He has had multiple recent I&D procedures.    Physical exam:  The patient is in no apparent distress.  The patient is afebrile and vital signs are stable.  Abdomen reveals an area of erythema with some folliculitis in the right lower quadrant.  There is no fluctuance.  The right thigh reveals an area of erythema with a central opening where the patient underwent an I&D yesterday.  There is no purulence within the wound.  I'm not able to express any purulent fluid from surrounding tissue.  The wound is gaping open, and the abscess cavity appears well drained.    This is a patient with multiple abscesses of uncertain etiology.  His right thigh wound, about which there was some concern, appears to be adequately drained.  There is not currently any indication for further debridement, as it is widely open.  I will be happy to follow the patient with you.  If he develops any significant additional abscesses, consideration could be given to incision and debridement.    Maury Jean MD  [facility]

## 2018-10-04 NOTE — ANESTHESIA PREPROCEDURE EVALUATION
PAC NOTE:       ANESTHESIA PRE EVALUATION:  Anesthesia Evaluation     . Pt has had prior anesthetic. Type: General           ROS/MED HX    ENT/Pulmonary:  - neg pulmonary ROS     Neurologic:  - neg neurologic ROS     Cardiovascular:  - neg cardiovascular ROS       METS/Exercise Tolerance:     Hematologic:  - neg hematologic  ROS       Musculoskeletal:  - neg musculoskeletal ROS       GI/Hepatic:  - neg GI/hepatic ROS       Renal/Genitourinary:  - ROS Renal section negative       Endo:  - neg endo ROS       Psychiatric:  - neg psychiatric ROS       Infectious Disease:   (+) Other Infectious Disease Left finger infection as well as multiple infxs on body      Malignancy:      - no malignancy   Other:    (+) No chance of pregnancy C-spine cleared: N/A, no H/O Chronic Pain,no other significant disability                    Physical Exam  Normal systems: cardiovascular, pulmonary and dental    Airway   Mallampati: II  TM distance: >3 FB  Neck ROM: full    Dental     Cardiovascular       Pulmonary              Anesthesia Plan      History & Physical Review  History and physical reviewed and following examination; no interval change.    ASA Status:  1 .    NPO Status:  > 8 hours    Plan for General and LMA with Intravenous induction. Maintenance will be Balanced.    PONV prophylaxis:  Ondansetron (or other 5HT-3) and Dexamethasone or Solumedrol       Postoperative Care  Postoperative pain management:  IV analgesics.      Consents  Anesthetic plan, risks, benefits and alternatives discussed with:  Patient.  Use of blood products discussed: Yes.   Use of blood products discussed with Patient.  Consented to blood products.  .                            .  
None

## 2018-10-04 NOTE — ANESTHESIA CARE TRANSFER NOTE
Patient: Justice Person    Procedure(s):  COMBINED IRRIGATION AND DEBRIDEMENT FINGER Left Ring Finger - Wound Class: II-Clean Contaminated    Diagnosis: Wound infection  Diagnosis Additional Information: No value filed.    Anesthesia Type:   General, LMA     Note:  Airway :Face Mask  Patient transferred to:PACU  Comments: VSS.  Spontaneously breathing O2 per open face mask.  Report given to RN.Handoff Report: Identifed the Patient, Identified the Reponsible Provider, Reviewed the pertinent medical history, Discussed the surgical course, Reviewed Intra-OP anesthesia mangement and issues during anesthesia, Set expectations for post-procedure period and Allowed opportunity for questions and acknowledgement of understanding      Vitals: (Last set prior to Anesthesia Care Transfer)    CRNA VITALS  10/4/2018 1700 - 10/4/2018 1734      10/4/2018             NIBP: 121/62    NIBP Mean: 75                Electronically Signed By: PJ Godfrey CRNA  October 4, 2018  5:34 PM

## 2018-10-04 NOTE — ANESTHESIA POSTPROCEDURE EVALUATION
Patient: Justice Person    Procedure(s):  COMBINED IRRIGATION AND DEBRIDEMENT FINGER Left Ring Finger - Wound Class: II-Clean Contaminated    Diagnosis:Wound infection  Diagnosis Additional Information: L ring finger dorsal superficial abscess.    Anesthesia Type:  General, LMA    Note:  Anesthesia Post Evaluation    Patient location during evaluation: PACU  Patient participation: Able to fully participate in evaluation  Level of consciousness: awake and alert  Pain management: adequate  Airway patency: patent  Cardiovascular status: acceptable  Respiratory status: acceptable  Hydration status: acceptable  PONV: controlled     Anesthetic complications: None          Last vitals:  Vitals:    10/04/18 1132 10/04/18 1432 10/04/18 1731   BP: 133/68 140/83 137/84   Pulse: 61     Resp: 16 14 11   Temp: 96.7  F (35.9  C) 97.8  F (36.6  C) 97.7  F (36.5  C)   SpO2: 93% 98% 98%         Electronically Signed By: Levar Cavanaugh MD  October 4, 2018  5:41 PM

## 2018-10-04 NOTE — ANESTHESIA POSTPROCEDURE EVALUATION
Patient: Justice Person    Procedure(s):  COMBINED IRRIGATION AND DEBRIDEMENT FINGER Left Ring Finger - Wound Class: II-Clean Contaminated    Diagnosis:Wound infection  Diagnosis Additional Information: L ring finger dorsal superficial abscess.    Anesthesia Type:  General, LMA    Note:  Anesthesia Post Evaluation    Patient location during evaluation: PACU  Patient participation: Able to fully participate in evaluation  Level of consciousness: awake and alert  Pain management: adequate  Airway patency: patent  Cardiovascular status: acceptable  Respiratory status: acceptable  Hydration status: acceptable  PONV: controlled     Anesthetic complications: None          Last vitals:  Vitals:    10/04/18 1132 10/04/18 1432 10/04/18 1731   BP: 133/68 140/83 137/84   Pulse: 61     Resp: 16 14 11   Temp: 96.7  F (35.9  C) 97.8  F (36.6  C) 97.7  F (36.5  C)   SpO2: 93% 98% 98%         Electronically Signed By: Levar Cavanaugh MD  October 4, 2018  5:42 PM

## 2018-10-04 NOTE — CONSULTS
Consult Date:  10/04/2018      LOCATION:  Room 231 [facility].      REFERRING PHYSICIAN:  Miguel Graham MD      IMPRESSION:   1.  A 47-year-old healthy male with multiple recent skin abscesses, now with right thigh and left fourth finger abscesses, no clinical sepsis, almost certainly a Staph aureus type process, although see #2 below.   2.  Recent perirectal infection treated with antibiotics, would be an unusual location for a Staph aureus abscess, but possible.      RECOMMENDATIONS:   1.  On vancomycin and Zosyn.  We will continue that for now.   2.  Await the culture from the drained thigh abscess.   3.  Going to the OR to drain the finger, probably is not deep but await the findings.  If it is deep, obviously will need extended IV.  If it is a simple abscess, oral therapy directed at the culture will be the treatment.   4.  Discussed this in detail with patient and family.   5.  Going to the OR anyway, I would probably drain the right thigh abscess better, had an ER drainage but still significant induration around the area.      HISTORY:  This 47-year-old male is seen in consultation due to multiple skin abscesses.  The patient is healthy and no history of infection problems of any kind, started having perirectal pain and he thought hemorrhoid several weeks ago, was seen by Colorectal.  There was an infection in the area.  He got oral antibiotics preceding that and they were continued.  That area is resolved.  While that was occurring he had an abscess occur in his left groin area and suprapubic area.  This was drained with no culture done, got antibiotics and improved.  He then rapidly now has developed 3 new abscesses, 1 in his lower abdomen that is not very fluctuant, a more fluctuant abscess in his right thigh that was drained in the ER with culture pending, and rapidly occurring infection in his left fourth finger.  He can move the finger relatively well.  He has no significant systemic  symptoms with this.      PAST MEDICAL HISTORY:  No major infection problems historically.      SOCIAL/FAMILY HISTORY:  No family history of note, including no one else in the family with recurrent skin abscesses or boils.  No immunological problems.      ALLERGIES:  NONE.      REVIEW OF SYSTEMS:  Otherwise unremarkable.      PHYSICAL EXAMINATION:   GENERAL:  The patient appears his stated age, does not look toxic or ill.   VITAL SIGNS:  Within normal limits.   HEENT:  No thrush or intraoral lesions.  Pupils reactive.   NECK:  Supple and nontender.   HEART AND LUNGS:  Normal.   ABDOMEN:  Soft and nontender.   EXTREMITIES:  The skin with 3 active skin abscesses.  The one on his right thigh has a typical Staph aureus, community-acquired MRSA type appearance.  The finger does not appear to be deep as he can move the finger reasonably well despite considerable swelling and redness.  He has a new area also in his lower abdomen.  It looks like a typical boil.      LABORATORY DATA:  Culture pending, labs unremarkable.      Thank you very much for this consultation.  We will follow the patient with you.         HENNA TABOR MD             D: 10/04/2018   T: 10/04/2018   MT: CC      Name:     HILARY GUERRA   MRN:      [id number]        Account:       [id number]   :      1970           Consult Date:  10/04/2018      Document: [id number]       cc: Juju Victor PA-C

## 2018-10-04 NOTE — CONSULTS
[facility] Orthopedic Consultation    Justice Person MRN# [id number]   Age: 47 year old YOB: 1970     Date of Admission:  10/3/2018    Reason for consult: Left 4th finger abscess       Requesting physician: Dr. Miguel Forrest       Level of consult: Consult, follow and place orders           Assessment and Plan:   Assessment:   Left dorsal fourth finger abscess  Right groin abscess, surrounding follicilitis      Plan:   OR today for I&D of finger abscess  NPO  Keep finger clean, dry, and covered  Right groin abscess with prior I&D in ED with wound packing  WOC nursing for wound cars of the right groin  General surgery if further intervention needed  Dermatology follow-up as outpatient/PCP follow-up as well           Chief Complaint:   Left finger abscess         History of Present Illness:   This patient is a 47 year old male who presents with the following condition requiring a hospital admission:    Justice states that he had a small bump on the back of his finger since Saturday 9/29. On Sunday it became more inflamed, red, and painful. He was seen and initially placed on Bactrim and Augmentin. Monday and Tuesday the area became increasingly symptomatic. He presented to the ED last night for further evaluation and treatment. He reports no areas of trauma, laceration, bites, or foreign objects in the skin.  Of note he also has multiple areas surrounding his right groin with folliculitis and at least 3 other areas of abscess. On 9/17 he had I&D of perianal abscess and left suprapubic abscess and yesterday I&D of right groin abscess with packing.           Past Medical History:     Past Medical History:   Diagnosis Date     Abscess      Cellulitis              Past Surgical History:   History reviewed. No pertinent surgical history.          Social History:     Social History   Substance Use Topics     Smoking status: Never Smoker     Smokeless tobacco: Never Used     Alcohol use Yes              "Family History:     Family History   Problem Relation Age of Onset     Diabetes Maternal Grandmother      Cancer - colorectal No family hx of      Prostate Cancer No family hx of              Immunizations:     VACCINE/DOSE   Diptheria   DPT   DTAP   HBIG   Hepatitis A   Hepatitis B   HIB   Influenza   Measles   Meningococcal   MMR   Mumps   Pneumococcal   Polio   Rubella   Small Pox   TDAP   Varicella   Zoster             Allergies:     Allergies   Allergen Reactions     No Known Drug Allergies              Medications:     Current Facility-Administered Medications   Medication     acetaminophen (TYLENOL) tablet 650 mg     naloxone (NARCAN) injection 0.1-0.4 mg     piperacillin-tazobactam (ZOSYN) infusion 3.375 g     traMADol (ULTRAM) tablet 50 mg             Review of Systems:   CV: NEGATIVE for chest pain, palpitations or peripheral edema  C: NEGATIVE for fever, chills, change in weight  E/M: NEGATIVE for ear, mouth and throat problems  R: NEGATIVE for significant cough or SOB          Physical Exam:   All vitals have been reviewed  Patient Vitals for the past 24 hrs:   BP Temp Temp src Pulse Resp SpO2 Height Weight   10/04/18 0041 130/61 97.4  F (36.3  C) Oral 59 16 95 % - -   10/03/18 1446 137/79 98.2  F (36.8  C) Oral 92 20 94 % 1.778 m (5' 10\") 88.5 kg (195 lb)     No intake or output data in the 24 hours ending 10/04/18 0802    Left finger with large area of erythema over the PIP joint and extending onto both phalanges dorsally  No erythema on the volar aspect  Able to Range MCP and DIP without issue  No tenderness or loss of motion in the extensor or flexor tendons  Mild drainage in the central area of abscess          Data:   All laboratory data reviewed  Results for orders placed or performed during the hospital encounter of 10/03/18   Fingers XR, 2-3 views, left    Narrative    LEFT FINGER TWO OR MORE VIEWS   10/3/2018 3:42 PM     HISTORY: Evaluate for infection.     COMPARISON: None.      Impression    " IMPRESSION: No evidence of acute fracture or subluxation. No lytic or  destructive lesions. Prominent soft tissue swelling dorsal aspect of  fourth finger.    HENNA SOTO MD   Basic metabolic panel   Result Value Ref Range    Sodium 139 133 - 144 mmol/L    Potassium 4.1 3.4 - 5.3 mmol/L    Chloride 105 94 - 109 mmol/L    Carbon Dioxide 27 20 - 32 mmol/L    Anion Gap 7 3 - 14 mmol/L    Glucose 86 70 - 99 mg/dL    Urea Nitrogen 16 7 - 30 mg/dL    Creatinine 1.29 (H) 0.66 - 1.25 mg/dL    GFR Estimate 59 (L) >60 mL/min/1.7m2    GFR Estimate If Black 71 >60 mL/min/1.7m2    Calcium 9.6 8.5 - 10.1 mg/dL   CBC with platelets differential   Result Value Ref Range    WBC 11.1 (H) 4.0 - 11.0 10e9/L    RBC Count 5.50 4.4 - 5.9 10e12/L    Hemoglobin 16.7 13.3 - 17.7 g/dL    Hematocrit 49.0 40.0 - 53.0 %    MCV 89 78 - 100 fl    MCH 30.4 26.5 - 33.0 pg    MCHC 34.1 31.5 - 36.5 g/dL    RDW 12.7 10.0 - 15.0 %    Platelet Count 333 150 - 450 10e9/L    Diff Method Automated Method     % Neutrophils 83.4 %    % Lymphocytes 9.8 %    % Monocytes 5.0 %    % Eosinophils 1.0 %    % Basophils 0.4 %    % Immature Granulocytes 0.4 %    Nucleated RBCs 0 0 /100    Absolute Neutrophil 9.3 (H) 1.6 - 8.3 10e9/L    Absolute Lymphocytes 1.1 0.8 - 5.3 10e9/L    Absolute Monocytes 0.6 0.0 - 1.3 10e9/L    Absolute Eosinophils 0.1 0.0 - 0.7 10e9/L    Absolute Basophils 0.0 0.0 - 0.2 10e9/L    Abs Immature Granulocytes 0.0 0 - 0.4 10e9/L    Absolute Nucleated RBC 0.0    CBC with platelets differential   Result Value Ref Range    WBC 7.8 4.0 - 11.0 10e9/L    RBC Count 5.17 4.4 - 5.9 10e12/L    Hemoglobin 15.6 13.3 - 17.7 g/dL    Hematocrit 46.5 40.0 - 53.0 %    MCV 90 78 - 100 fl    MCH 30.2 26.5 - 33.0 pg    MCHC 33.5 31.5 - 36.5 g/dL    RDW 12.8 10.0 - 15.0 %    Platelet Count 305 150 - 450 10e9/L    Diff Method Automated Method     % Neutrophils 76.4 %    % Lymphocytes 13.4 %    % Monocytes 6.7 %    % Eosinophils 2.7 %    % Basophils 0.5 %    %  Immature Granulocytes 0.3 %    Nucleated RBCs 0 0 /100    Absolute Neutrophil 5.9 1.6 - 8.3 10e9/L    Absolute Lymphocytes 1.0 0.8 - 5.3 10e9/L    Absolute Monocytes 0.5 0.0 - 1.3 10e9/L    Absolute Eosinophils 0.2 0.0 - 0.7 10e9/L    Absolute Basophils 0.0 0.0 - 0.2 10e9/L    Abs Immature Granulocytes 0.0 0 - 0.4 10e9/L    Absolute Nucleated RBC 0.0    Basic metabolic panel   Result Value Ref Range    Sodium 139 133 - 144 mmol/L    Potassium 4.2 3.4 - 5.3 mmol/L    Chloride 107 94 - 109 mmol/L    Carbon Dioxide 27 20 - 32 mmol/L    Anion Gap 5 3 - 14 mmol/L    Glucose 88 70 - 99 mg/dL    Urea Nitrogen 14 7 - 30 mg/dL    Creatinine 1.38 (H) 0.66 - 1.25 mg/dL    GFR Estimate 55 (L) >60 mL/min/1.7m2    GFR Estimate If Black 66 >60 mL/min/1.7m2    Calcium 8.6 8.5 - 10.1 mg/dL   Magnesium   Result Value Ref Range    Magnesium 2.2 1.6 - 2.3 mg/dL   Wound Culture Aerobic Bacterial   Result Value Ref Range    Specimen Description Right Thigh Wound     Culture Micro PENDING    Methicillin Resistant Staph Aureus PCR   Result Value Ref Range    Specimen Description Nares     Methicillin Resist/Sens S. aureus PCR Negative NEG^Negative          Attestation:  I have reviewed today's vital signs, notes, medications, labs and imaging with Dr. Muñoz.  Amount of time performed on this consult: 35 minutes.    Whit Payton PA-C

## 2018-10-04 NOTE — PLAN OF CARE
Problem: Patient Care Overview  Goal: Discharge Needs Assessment  Outcome: No Change  PRIMARY DIAGNOSIS: SOFT TISSUE INFECTIONS  OUTPATIENT/OBSERVATION GOALS TO BE MET BEFORE DISCHARGE:  1. Vitals sign stable or return to baseline: Yes    2. Tolerating oral antibiotics or has home infusion set up if applicable: No, still requiring IV abx    3. Pain status: Improved-controlled with oral pain medications.    4. Return to near baseline physical activity: Yes    Discharge Planner Nurse   Safe discharge environment identified: Yes  Barriers to discharge: Yes, awaiting completion of consults       Entered by: Arleth Larsen 10/04/2018 1:09 AM     Please review provider order for any additional goals.     Nurse to notify provider when observation goals have been met and patient is ready for discharge.    VSS on RA. Pt c/o L 4th finger pain, prn tramadol given; pt reports minimal improvement with prior tramadol dosing, however unable to give tylenol at this time. Up in room ad sheldon. Voiding without saving, tolerating diet and PO meds. PIV SL. Dressing to finger CDI, CMS intact. R upper thigh wound red with packing, pt reports improvement in discomfort and size.CMS to R thigh intact. Alert and oriented, able to make needs known. Continue to monitor.

## 2018-10-04 NOTE — OP NOTE
PREOPERATIVE DIAGNOSIS: L ring finger dorsal superficial abscess.    POSTOPERATIVE DIAGNOSIS: L ring finger dorsal superficial abscess.    PROCEDURE(S): L ring finger abscess I&D with excisional debridement of nonviable skin and subcutaneous tissues.    ATTENDING SURGEON: Dr. Dilan Muñoz.     ASSISTANT SURGEON: None.    ANESTHESIA: General.    EBL: 5mL.    TOURNIQUET TIME: None.    SPECIMENS: L ring finger abscess fluid sent for gram stain and culture.    COMPLICATIONS: None apparent.    INDICATIONS: Justice is a pleasant 47 year-old LHD gentleman who presented to the emergency department with onset of multiple superficial abscesses involving the low back, groin, and left hand.  The patient incidentally had undergone undergone irrigation and debridement of a perineal abscess within the past month.  The patient denied any prior history of significant infections or abscesses.  Examination demonstrated a superficial abscess involving the dorsal aspect of the left ring finger at the level of the PIP joint.  Given the persistent drainage and erythema surrounding the abscess, formal irrigation and debridement was recommended.  After full discussion of the benefits and risks of surgery including wound healing complications, bleeding, persistent infection, and need for further surgery, the patient provided informed consent to proceed.    The patient was identified in the pre-operative holding area on the date of surgery.  The operative site was marked with indelible marker and the patient was brought back to the operating room and transferred to the operating table in a supine position.  All bony prominences were well-padded.  Anesthesia was administered without complication.  The left upper extremity was prepped and draped in standard sterile fashion.  A pre-operative timeout was performed identifying the correct patient, procedure, operative site, antibiotic administration, and equipment necessary for the  procedure.    A longitudinal incision was made over the dorsal aspect of the left ring finger, along the border of the draining abscess.  Soft tissue dissection was carefully carried down through skin and subcutaneous tissue to the level of the extensor sheath.  Mild purulence was encountered.  Some of the fluid encountered within the deep wound was sent for Gram stain and culture.  There was no evidence of tracking of infection along the extensor tendon or sheath.  There is no evidence of extension of infection down proximally to the PIP joint.  The abscess actually was localized over the midportion of the middle phalanx without concern for PIP joint infection.  The wound was copiously irrigated after completing excisional debridement of nonviable skin and subcutaneous tissue.  I was able to reapproximate the incised skin and subcutaneous tissues with 4-0 nylon suture.  One of the open draining wounds from the abscess was packed with NuGauze packing near the distal end of the incision.  Xeroform was applied over the friable skin overlying the abscess.  Sterile dressings were applied.  A ring block was performed for the ring finger utilizing 0.5% Marcaine for postoperative pain relief.  The patient was extubated and brought to the PACU in stable condition for further postoperative care.    Postoperatively, the patient will be encouraged to perform gentle range of motion exercises for the ring finger.  The patient may remove his dressings on postoperative day 2 and begin daily warm water soaks and daily dry dressing changes with changing of the packing within the abscess sinus as the depth of the wound allows.  We will continue to monitor the patient's care carefully over the next few days to ensure that his abscess continues to improve with IV antibiotic administration.  We will plan to have the patient return to clinic with hand surgery in approximately 10-14 days for wound check, suture removal, and range of  motion check of the finger.    Of note, all counts were correct at the conclusion of the case.

## 2018-10-05 LAB
ANION GAP SERPL CALCULATED.3IONS-SCNC: 7 MMOL/L (ref 3–14)
BUN SERPL-MCNC: 13 MG/DL (ref 7–30)
CALCIUM SERPL-MCNC: 8.2 MG/DL (ref 8.5–10.1)
CHLORIDE SERPL-SCNC: 107 MMOL/L (ref 94–109)
CO2 SERPL-SCNC: 25 MMOL/L (ref 20–32)
CREAT SERPL-MCNC: 1.15 MG/DL (ref 0.66–1.25)
GFR SERPL CREATININE-BSD FRML MDRD: 67 ML/MIN/1.7M2
GLUCOSE SERPL-MCNC: 113 MG/DL (ref 70–99)
POTASSIUM SERPL-SCNC: 4.2 MMOL/L (ref 3.4–5.3)
SODIUM SERPL-SCNC: 139 MMOL/L (ref 133–144)

## 2018-10-05 PROCEDURE — 36415 COLL VENOUS BLD VENIPUNCTURE: CPT | Performed by: ORTHOPAEDIC SURGERY

## 2018-10-05 PROCEDURE — 80048 BASIC METABOLIC PNL TOTAL CA: CPT | Performed by: ORTHOPAEDIC SURGERY

## 2018-10-05 PROCEDURE — 99232 SBSQ HOSP IP/OBS MODERATE 35: CPT | Performed by: INTERNAL MEDICINE

## 2018-10-05 PROCEDURE — 82947 ASSAY GLUCOSE BLOOD QUANT: CPT | Performed by: ORTHOPAEDIC SURGERY

## 2018-10-05 PROCEDURE — 99231 SBSQ HOSP IP/OBS SF/LOW 25: CPT | Performed by: SURGERY

## 2018-10-05 PROCEDURE — 25000128 H RX IP 250 OP 636: Performed by: INTERNAL MEDICINE

## 2018-10-05 PROCEDURE — 25000132 ZZH RX MED GY IP 250 OP 250 PS 637: Performed by: ORTHOPAEDIC SURGERY

## 2018-10-05 PROCEDURE — G0463 HOSPITAL OUTPT CLINIC VISIT: HCPCS

## 2018-10-05 PROCEDURE — 12000000 ZZH R&B MED SURG/OB

## 2018-10-05 PROCEDURE — 25000128 H RX IP 250 OP 636: Performed by: ORTHOPAEDIC SURGERY

## 2018-10-05 RX ORDER — AMOXICILLIN 250 MG
2 CAPSULE ORAL 2 TIMES DAILY
Qty: 20 TABLET | Refills: 0 | Status: SHIPPED | OUTPATIENT
Start: 2018-10-05 | End: 2019-05-23

## 2018-10-05 RX ORDER — ACETAMINOPHEN 325 MG/1
650 TABLET ORAL EVERY 4 HOURS PRN
Qty: 100 TABLET | COMMUNITY
Start: 2018-10-07 | End: 2020-12-15

## 2018-10-05 RX ORDER — TRAMADOL HYDROCHLORIDE 50 MG/1
50 TABLET ORAL EVERY 4 HOURS PRN
Qty: 20 TABLET | Refills: 0 | Status: SHIPPED | OUTPATIENT
Start: 2018-10-05 | End: 2019-05-23

## 2018-10-05 RX ADMIN — Medication: at 11:55

## 2018-10-05 RX ADMIN — ACETAMINOPHEN 975 MG: 325 TABLET, FILM COATED ORAL at 22:03

## 2018-10-05 RX ADMIN — VANCOMYCIN HYDROCHLORIDE 1500 MG: 10 INJECTION, POWDER, LYOPHILIZED, FOR SOLUTION INTRAVENOUS at 13:52

## 2018-10-05 RX ADMIN — TAZOBACTAM SODIUM AND PIPERACILLIN SODIUM 3.38 G: 375; 3 INJECTION, SOLUTION INTRAVENOUS at 09:48

## 2018-10-05 RX ADMIN — TAZOBACTAM SODIUM AND PIPERACILLIN SODIUM 3.38 G: 375; 3 INJECTION, SOLUTION INTRAVENOUS at 04:11

## 2018-10-05 RX ADMIN — SODIUM CHLORIDE, POTASSIUM CHLORIDE, SODIUM LACTATE AND CALCIUM CHLORIDE: 600; 310; 30; 20 INJECTION, SOLUTION INTRAVENOUS at 06:01

## 2018-10-05 RX ADMIN — SENNOSIDES AND DOCUSATE SODIUM 1 TABLET: 8.6; 5 TABLET ORAL at 19:51

## 2018-10-05 RX ADMIN — SENNOSIDES AND DOCUSATE SODIUM 1 TABLET: 8.6; 5 TABLET ORAL at 08:45

## 2018-10-05 RX ADMIN — VANCOMYCIN HYDROCHLORIDE 1500 MG: 10 INJECTION, POWDER, LYOPHILIZED, FOR SOLUTION INTRAVENOUS at 02:12

## 2018-10-05 RX ADMIN — ACETAMINOPHEN 975 MG: 325 TABLET, FILM COATED ORAL at 13:52

## 2018-10-05 RX ADMIN — ACETAMINOPHEN 975 MG: 325 TABLET, FILM COATED ORAL at 06:01

## 2018-10-05 ASSESSMENT — ACTIVITIES OF DAILY LIVING (ADL)
ADLS_ACUITY_SCORE: 9
ADLS_ACUITY_SCORE: 9
ADLS_ACUITY_SCORE: 11
ADLS_ACUITY_SCORE: 9

## 2018-10-05 NOTE — PROGRESS NOTES
"[facility]   General Surgery Progress Note           Assessment and Plan:   Assessment:   Right thigh abscess - s/p I&D in ER 10/3.  Cultures pending.  Left ring finger abscess, s/p I&D in OR 10/4       Plan:   -abx:  Vanco, Zosyn.  ID following  -daily wound care to R thigh abscess by nursing.  Pt wife to perform wound care after discharge.  WOCN following.  -bowel meds:  Senokot BID  -Pain control:  Tylenol.  Ibuprofen, tramadol, Oxycodone available.  -Dispo per ID/hospitalist         Interval History:   No c/o.  Denies pain at I&D sites, not requiring narcotics.  Up walking ok.  Reports redness at R thigh I&D site improved today.  Planning for first dressing change to L ring finger tomorrow. Tolerating diet.  Hoping to discharge soon.  Pt wife has been instructed on wound care to R thigh.         Physical Exam:   Blood pressure 113/58, pulse 61, temperature 96.4  F (35.8  C), temperature source Oral, resp. rate 17, height 1.778 m (5' 10\"), weight 88.5 kg (195 lb), SpO2 98 %.    I/O last 3 completed shifts:  In: 3384 [P.O.:1370; I.V.:2014]  Out: 2865 [Urine:2860; Blood:5]    Right thigh wound:  Surrounding erythema receding from drawn border.  + mild induration at rim of wound.  1/4 inch nugauze packing in place.  Planning for wound care after shower.               Data:       Recent Labs  Lab 10/04/18  0624 10/03/18  1536   HGB 15.6 16.7       Recent Labs  Lab 10/04/18  0624 10/03/18  1536   WBC 7.8 11.1*       Annalisa Thornton PA-C     Much improved.  Erythema around thigh wound has decreased.  Will sign off.  Patient may follow up in the office if any healing issues.    Maury Jean MD  [facility]    "

## 2018-10-05 NOTE — CONSULTS
CM following for possible home infusion for IV antibiotics. [facility] benefits check completed for the following:    [employer] [place] (Pt s plan)   100% coverage   [employer] [place] (Wife s plan)   Ded $500 (met zero)   Oop $6350 (met zero)   Co insur 80%   Pt will have 100% coverage at this point w his first insurance    Coverage discussed with pt and spouse, Barbara. Pt aware of home infusion process. CM will continue to follow with discharge planning.    Dodie Robin RN, BSN CTS  Care Coordinator  [phone]

## 2018-10-05 NOTE — PROGRESS NOTES
[facility]  Hospitalist Progress Note  Jose Miguel Boo MD 10/05/18    Reason for Stay (Diagnosis): left hand Abscesses         Assessment and Plan:      Summary of Stay: Justice Person is a 47 year old male with no significant past medical history other than recent multiple skin abscesses who was admitted on 10/3/2018 with right thigh and left fourth finger abscess despite recent treatment with antibiotics and was also found to have ANTONIA.  Patient will be going to OR today to for I&D of finger.    Problem List/Assessment and Plan:     1. Left 4th Finger Abscess:   - He is status post incision and debridement of the left fourth finger (10/04).  -Wound culture done on admission in the emergency room and also with incision and debridement so far negative  - Continue Vancomycin/Zosyn  - Orthopedics input appreciated.  - Pain control with Tylenol, Tramadol as needed    2. Right Thigh Abscess: S/p I&D in the ER.  -Continue to have surrounding erythema.  ID also consulted.  - Continue Vancomycin/Zosyn  - Surgery consulted and evaluated the patient, no need for I&D at this point per surgery.    3. ARF: Creatinine peaked at 1.38, now down to 1,  FENA 0.9.  This could be due to the Bactrim versus dehydration  -He was on IV fluids, BMP in the morning.  -Stop IV fluid  -Encourage oral intake of fluids    DVT Prophylaxis: Pneumatic Compression Devices  Code Status: Full Code  Discharge Dispo: Home  Estimated Disch Date / # of Days until Disch: Is to be in the hospital on IV antibiotics pending culture results.  I discussed with patient and his wife at bedside the plan of care.        Interval History (Subjective):      Patient seen and examined, feels much better, wife at bedside, no nausea or vomiting, no fever or chills.  Finger pain better normal sensation.  Denies nausea, emesis, abdominal pain, CP, SOB.  Stated he had a few episodes of abscess with drainage in the past, patient does not know the details  "or the organism identified.                  Physical Exam:      Last Vital Signs:  /58  Pulse 61  Temp 96.4  F (35.8  C) (Oral)  Resp 17  Ht 1.778 m (5' 10\")  Wt 88.5 kg (195 lb)  SpO2 98%  BMI 27.98 kg/m2    General: Alert, awake, no acute distress.  HEENT: Normocephalic and atraumatic, eyes anicteric and without scleral injection, EOMI, face symmetric, MMM.  Cardiac: RRR, normal S1, S2. No m/g/r, no LE edema.  Pulmonary: Normal chest rise, normal work of breathing.  Lungs CTAB without crackles or wheezing.  Abdomen: soft, non-tender, non-distended.  Normoactive bowel sounds, no guarding or rebound tenderness.  Extremities: no deformities.  Warm, well perfused.  Skin: Left 4th finger with dressing in place which is c/d/i, abscess of right thigh s/p I&D with packing in place - surrounding erythema has decreased from marked borders.  Several areas of folliculitis on the abdomen and legs.  Warm and Dry.  Neuro: No focal deficits.  Speech clear.  Coordination and strength grossly normal.  Psych: Alert and oriented x3. Appropriate affect.         Medications:      All current medications were reviewed with changes reflected in problem list.  Current Facility-Administered Medications   Medication     [START ON 10/7/2018] acetaminophen (TYLENOL) tablet 650 mg     acetaminophen (TYLENOL) tablet 975 mg     benzocaine-menthol (CHLORASEPTIC) 6-10 MG lozenge 1 lozenge     HYDROmorphone (PF) (DILAUDID) injection 0.3-0.5 mg     hydrOXYzine (ATARAX) tablet 25 mg     ibuprofen (ADVIL/MOTRIN) tablet 600 mg     [START ON 10/6/2018] influenza quadrivalent (PF) vacc (FLUZONE or Flulaval or FLUARIX) injection 0.5 mL     lidocaine (LMX4) cream     lidocaine 1 % 1 mL     metoclopramide (REGLAN) tablet 10 mg    Or     metoclopramide (REGLAN) injection 10 mg     naloxone (NARCAN) injection 0.1-0.4 mg     ondansetron (ZOFRAN-ODT) ODT tab 4 mg    Or     ondansetron (ZOFRAN) injection 4 mg     oxyCODONE IR (ROXICODONE) tablet " 5-10 mg     piperacillin-tazobactam (ZOSYN) infusion 3.375 g     prochlorperazine (COMPAZINE) injection 10 mg    Or     prochlorperazine (COMPAZINE) tablet 10 mg     senna-docusate (SENOKOT-S;PERICOLACE) 8.6-50 MG per tablet 1 tablet    Or     senna-docusate (SENOKOT-S;PERICOLACE) 8.6-50 MG per tablet 2 tablet     sodium chloride (PF) 0.9% PF flush 3 mL     sodium chloride (PF) 0.9% PF flush 3 mL     sodium hypochlorite (DAKINS) external solution     traMADol (ULTRAM) tablet 50 mg     vancomycin (VANCOCIN) 1,500 mg in sodium chloride 0.9 % 250 mL intermittent infusion            Data:      All new lab and imaging data was reviewed.   Labs:    Recent Labs  Lab 10/05/18  0721 10/04/18  0624 10/03/18  1536    139 139   POTASSIUM 4.2 4.2 4.1   CHLORIDE 107 107 105   CO2 25 27 27   ANIONGAP 7 5 7   * 88 86   BUN 13 14 16   CR 1.15 1.38* 1.29*   GFRESTIMATED 67 55* 59*   GFRESTBLACK 81 66 71   SINGH 8.2* 8.6 9.6       Recent Labs  Lab 10/04/18  0624 10/03/18  1536   WBC 7.8 11.1*   HGB 15.6 16.7   HCT 46.5 49.0   MCV 90 89    333        Recent Labs  Lab 10/04/18  1725 10/03/18  1803   CULT Culture negative monitoring continues  PENDING Culture in progress       Imaging:   No results found for this or any previous visit (from the past 24 hour(s)).

## 2018-10-05 NOTE — PLAN OF CARE
Problem: Skin and Soft Tissue Infection (Adult)  Goal: Signs and Symptoms of Listed Potential Problems Will be Absent, Minimized or Managed (Skin and Soft Tissue Infection)  Signs and symptoms of listed potential problems will be absent, minimized or managed by discharge/transition of care (reference Skin and Soft Tissue Infection (Adult) CPG).   Outcome: Improving  Ao, VSS. Denies pain. Leg wound changed by WOC nurse. Hand dressing CDI-CMS-Numbness improving. Voiding approp. BS+, passing gas. Up indep. Wife at bedside.

## 2018-10-05 NOTE — PLAN OF CARE
Problem: Patient Care Overview  Goal: Plan of Care/Patient Progress Review  Outcome: Improving  Vss except jeff hr (asymptomatic). Afebrile. Lungs clr-O2 2L mid 90s. Is done to 1800. Hypoactive bs/no gas, no nausea. Tolerating diet. Last bm 10/04/18. Voiding. Ivf infusing. Drsg (finger)-dried drainage. Cms-+1 edema & numbness to finger. No drain. Pain managed with Tylenol. Drsg (thigh)-cdi. Blanchable redness rt thigh area outlined-receding from borders. Zosyn & Vancomycin for antibiotic therapy. Repositioning self. Tolerating ice. No other significant issues noted overnight.

## 2018-10-05 NOTE — PROGRESS NOTES
Discharge Planner   Discharge Plans in progress: home with oral abx vs home with home infusion vs OP infusion  Barriers to discharge plan: cultures  Follow up plan: CM will continue to follow with possible home infusion needs. Pt has 100% coverage.       Entered by: Brittaney Robin 10/05/2018 1:55 PM

## 2018-10-05 NOTE — PROGRESS NOTES
Orthopedic Surgery  Justice Person  10/5/2018  Admit Date:  10/3/2018  POD # 1  S/P L ring finger abscess I&D with excisional debridement of nonviable skin and subcutaneous tissues    Patient resting comfortably in bed.    Pain controlled.  Tolerating oral intake.    Denies nausea or vomiting  Denies chest pain or shortness of breath  No events overnight.     Alert and orient to person, place, and time.  Vital Sign Ranges  Temperature Temp  Av.4  F (36.3  C)  Min: 96.7  F (35.9  C)  Max: 97.8  F (36.6  C)   Blood pressure Systolic (24hrs), Av , Min:117 , Max:140        Diastolic (24hrs), Av, Min:68, Max:85      Pulse Pulse  Av  Min: 61  Max: 61   Respirations Resp  Av.1  Min: 9  Max: 18   Pulse oximetry SpO2  Av.7 %  Min: 90 %  Max: 98 %       Dressing in place, c/d/i  Sensation intact in middle finger, ring finger still anesthetized   +cap refill     Labs:  Recent Labs   Lab Test  10/05/18   0721  10/04/18   0624  10/03/18   1536   POTASSIUM  4.2  4.2  4.1     Recent Labs   Lab Test  10/04/18   0624  10/03/18   1536   HGB  15.6  16.7     No results for input(s): INR in the last 42765 hours.  Recent Labs   Lab Test  10/04/18   0624  10/03/18   1536   PLT  305  333       A/P  1. Plan   Continue antibiotics per ID    Continue current pain regiment.   Dressing change tomorrow - WOC (packing and soaks)    2. Disposition   Anticipate d/c to home based on ID and medical plans - ok to discharge tomorrow per ortho at this point.    Whit Payton PA-C

## 2018-10-05 NOTE — PROGRESS NOTES
Focus: wounds  D: Pt and RN request WOC to assist with dressing change today and continue education. All abdominal wounds appear improved; less erythema, induration noted today.  Right thigh wound is about half the depth of yesterday and much improved with dakin's solution.  Left finger wound was not assessed, dressing CDI.    I: wound assessment and dressing change  A/P: Stable and improved wounds to abdomen and thigh. Ortho MD is following the left finger wound and would recommend to begin use of Dakin's to nu gauze and lightly pack the wound as needed.   Spouse and pt were provided education on care and follow up.

## 2018-10-06 VITALS
HEIGHT: 70 IN | DIASTOLIC BLOOD PRESSURE: 71 MMHG | SYSTOLIC BLOOD PRESSURE: 148 MMHG | BODY MASS INDEX: 27.92 KG/M2 | WEIGHT: 195 LBS | HEART RATE: 61 BPM | OXYGEN SATURATION: 96 % | RESPIRATION RATE: 16 BRPM | TEMPERATURE: 97.3 F

## 2018-10-06 LAB
BACTERIA SPEC CULT: ABNORMAL
BACTERIA SPEC CULT: ABNORMAL
CREAT SERPL-MCNC: 1.08 MG/DL (ref 0.66–1.25)
GFR SERPL CREATININE-BSD FRML MDRD: 72 ML/MIN/1.7M2
GLUCOSE SERPL-MCNC: 93 MG/DL (ref 70–99)
SPECIMEN SOURCE: ABNORMAL
VANCOMYCIN SERPL-MCNC: 12.6 MG/L

## 2018-10-06 PROCEDURE — 36415 COLL VENOUS BLD VENIPUNCTURE: CPT | Performed by: ORTHOPAEDIC SURGERY

## 2018-10-06 PROCEDURE — 25000132 ZZH RX MED GY IP 250 OP 250 PS 637: Performed by: INTERNAL MEDICINE

## 2018-10-06 PROCEDURE — 90686 IIV4 VACC NO PRSV 0.5 ML IM: CPT | Performed by: SURGERY

## 2018-10-06 PROCEDURE — 82947 ASSAY GLUCOSE BLOOD QUANT: CPT | Performed by: ORTHOPAEDIC SURGERY

## 2018-10-06 PROCEDURE — 25000132 ZZH RX MED GY IP 250 OP 250 PS 637: Performed by: ORTHOPAEDIC SURGERY

## 2018-10-06 PROCEDURE — 99239 HOSP IP/OBS DSCHRG MGMT >30: CPT | Performed by: INTERNAL MEDICINE

## 2018-10-06 PROCEDURE — 25000128 H RX IP 250 OP 636: Performed by: SURGERY

## 2018-10-06 PROCEDURE — 82565 ASSAY OF CREATININE: CPT | Performed by: ORTHOPAEDIC SURGERY

## 2018-10-06 PROCEDURE — 25000128 H RX IP 250 OP 636: Performed by: INTERNAL MEDICINE

## 2018-10-06 PROCEDURE — 36415 COLL VENOUS BLD VENIPUNCTURE: CPT | Performed by: INTERNAL MEDICINE

## 2018-10-06 PROCEDURE — 80202 ASSAY OF VANCOMYCIN: CPT | Performed by: INTERNAL MEDICINE

## 2018-10-06 RX ORDER — SULFAMETHOXAZOLE/TRIMETHOPRIM 800-160 MG
1 TABLET ORAL 2 TIMES DAILY
Qty: 10 TABLET | Refills: 0 | Status: SHIPPED | OUTPATIENT
Start: 2018-10-06 | End: 2018-10-06

## 2018-10-06 RX ORDER — SULFAMETHOXAZOLE/TRIMETHOPRIM 800-160 MG
1 TABLET ORAL 2 TIMES DAILY
Qty: 10 TABLET | Refills: 0 | Status: SHIPPED | OUTPATIENT
Start: 2018-10-06 | End: 2018-10-15

## 2018-10-06 RX ADMIN — ACETAMINOPHEN 975 MG: 325 TABLET, FILM COATED ORAL at 14:00

## 2018-10-06 RX ADMIN — ACETAMINOPHEN 975 MG: 325 TABLET, FILM COATED ORAL at 05:43

## 2018-10-06 RX ADMIN — INFLUENZA A VIRUS A/MICHIGAN/45/2015 X-275 (H1N1) ANTIGEN (FORMALDEHYDE INACTIVATED), INFLUENZA A VIRUS A/SINGAPORE/INFIMH-16-0019/2016 IVR-186 (H3N2) ANTIGEN (FORMALDEHYDE INACTIVATED), INFLUENZA B VIRUS B/PHUKET/3073/2013 ANTIGEN (FORMALDEHYDE INACTIVATED), AND INFLUENZA B VIRUS B/MARYLAND/15/2016 BX-69A ANTIGEN (FORMALDEHYDE INACTIVATED) 0.5 ML: 15; 15; 15; 15 INJECTION, SUSPENSION INTRAMUSCULAR at 16:26

## 2018-10-06 RX ADMIN — Medication: at 11:41

## 2018-10-06 RX ADMIN — VANCOMYCIN HYDROCHLORIDE 1500 MG: 10 INJECTION, POWDER, LYOPHILIZED, FOR SOLUTION INTRAVENOUS at 14:00

## 2018-10-06 RX ADMIN — TRAMADOL HYDROCHLORIDE 50 MG: 50 TABLET, COATED ORAL at 13:01

## 2018-10-06 RX ADMIN — VANCOMYCIN HYDROCHLORIDE 1500 MG: 10 INJECTION, POWDER, LYOPHILIZED, FOR SOLUTION INTRAVENOUS at 02:50

## 2018-10-06 RX ADMIN — SENNOSIDES AND DOCUSATE SODIUM 1 TABLET: 8.6; 5 TABLET ORAL at 08:36

## 2018-10-06 ASSESSMENT — ACTIVITIES OF DAILY LIVING (ADL)
ADLS_ACUITY_SCORE: 9

## 2018-10-06 NOTE — PLAN OF CARE
Problem: Patient Care Overview  Goal: Plan of Care/Patient Progress Review  Patient A&Ox4. VSS. Ambulates independently. +BS/gas, tolerating diet. Voiding in adequate amounts. CMS intact. Wound care complete per plan of care. Taking Ultram PRN for pain control. Plan is to discharge to home after afternoon IV abx complete. Will continue to monitor.

## 2018-10-06 NOTE — PHARMACY-VANCOMYCIN DOSING SERVICE
Pharmacy Vancomycin Note  Date of Service 2018  Patient's  1970   47 year old, male    Indication: Skin and Soft Tissue Infection  Goal Trough Level: 10-15 mg/L  Day of Therapy: 4  Current Vancomycin regimen:  1500 mg IV q12h    Current estimated CrCl = Estimated Creatinine Clearance: 92.7 mL/min (based on Cr of 1.08).    Creatinine for last 3 days  10/3/2018:  3:36 PM Creatinine 1.29 mg/dL  10/4/2018:  6:24 AM Creatinine 1.38 mg/dL  10/5/2018:  7:21 AM Creatinine 1.15 mg/dL  10/6/2018:  5:49 AM Creatinine 1.08 mg/dL    Recent Vancomycin Levels (past 3 days)  10/6/2018: 12:48 PM Vancomycin Level 12.6 mg/L    Vancomycin IV Administrations (past 72 hours)      No vancomycin orders with administrations in past 72 hours.                Nephrotoxins and other renal medications (Future)    Start     Dose/Rate Route Frequency Ordered Stop    10/04/18 1921  ibuprofen (ADVIL/MOTRIN) tablet 600 mg      600 mg Oral EVERY 6 HOURS PRN 10/04/18 192               Contrast Orders - past 72 hours     None          Interpretation of levels and current regimen:  Trough level is  Therapeutic    Has serum creatinine changed > 50% in last 72 hours: No    Urine output:  good urine output    Renal Function: Stable    Plan:  1.  Continue Current Dose  2.  Pharmacy will check trough levels as appropriate in 1-3 Days.            .

## 2018-10-06 NOTE — DISCHARGE SUMMARY
Admit Date:     10/03/2018   Discharge Date:           PRIMARY CARE PHYSICIAN:  Juju Victor PA-C      DISCHARGE DIAGNOSES:   1.  Left fourth finger abscess, status post irrigation and debridement, MRSA positive.   2.  Acute renal failure.      DISPOSITION:  Home.      DISCHARGE MEDICATIONS:  Reviewed and reconciled as in electronic medical record.      Review of your medicines      START taking       Dose / Directions    acetaminophen 325 MG tablet   Commonly known as:  TYLENOL   Used for:  Cellulitis of finger of left hand        Dose:  650 mg   Take 2 tablets (650 mg) by mouth every 4 hours as needed for other (multimodal surgical pain management along with NSAIDS and opioid medication as indicated based on pain control and physical function.)   Quantity:  100 tablet   Refills:  0       senna-docusate 8.6-50 MG per tablet   Commonly known as:  SENOKOT-S;PERICOLACE   Used for:  Cellulitis of finger of left hand        Dose:  2 tablet   Take 2 tablets by mouth 2 times daily   Quantity:  20 tablet   Refills:  0       traMADol 50 MG tablet   Commonly known as:  ULTRAM   Used for:  Cellulitis of finger of left hand        Dose:  50 mg   Take 1 tablet (50 mg) by mouth every 4 hours as needed for moderate pain   Quantity:  20 tablet   Refills:  0         CONTINUE these medicines which have NOT CHANGED       Dose / Directions    chlorhexidine 4 % liquid   Commonly known as:  HIBICLENS   Used for:  Cellulitis of right thigh, Cellulitis of left ring finger, Folliculitis        Apply topically daily as needed for wound care May use for bathing wounds   Quantity:  500 mL   Refills:  3       sulfamethoxazole-trimethoprim 800-160 MG per tablet   Commonly known as:  BACTRIM DS/SEPTRA DS   Used for:  Cellulitis of right thigh, Cellulitis of left ring finger        Dose:  1 tablet   Take 1 tablet by mouth 2 times daily   Quantity:  20 tablet   Refills:  0         STOP taking          amoxicillin-clavulanate 875-125 MG per  tablet   Commonly known as:  AUGMENTIN           mupirocin 2 % ointment   Commonly known as:  BACTROBAN                Where to get your medicines      Some of these will need a paper prescription and others can be bought over the counter. Ask your nurse if you have questions.     Bring a paper prescription for each of these medications      senna-docusate 8.6-50 MG per tablet     traMADol 50 MG tablet       You don't need a prescription for these medications      acetaminophen 325 MG tablet            DISCHARGE CONDITION:  Stable.      DISCHARGE PHYSICAL EXAMINATION:    VITAL SIGNS:  Blood pressure 180/65, pulse rate 60, temperature 95, oxygen saturation 96%.   HEENT:  Pink, nonicteric.  Extraocular muscle movement intact.   NECK:  Supple, no JVD, no thyromegaly.   CHEST:  Good air entry bilaterally.  No wheezing, crackles, or rales.   CARDIOVASCULAR:  S1, S2 regular, no gallop or murmur.   ABDOMEN:  Soft, nontender, nondistended, positive bowel sounds.   EXTREMITIES:  No edema, cyanosis, or clubbing.  Left fourth finger with dressing in place.  The hyperemic area around is resolving.  Surrounding erythema is decreased from border, status post incision and drainage and debridement.      PROCEDURE PERFORMED:  Incision and debridement of the left fourth finger.      CONSULTATIONS:  Orthopedic Surgery, Infectious Disease.      DISCHARGE DIET:  Regular.      DISCHARGE ACTIVITY:  As tolerated.      DISCHARGE FOLLOWUP:  Primary care provider in 1 week.  Followup with Orthopedic Surgery as instructed.  Follow up with Infectious Disease after 2 weeks to discuss about recurrent MRSA infection.      BRIEF HISTORY AND HOSPITAL COURSE:  Justice Person is a 47-year-old male with no significant past medical history other than multiple skin abscesses with drainage in the past.  This time, came again with skin abscesses at 3 spots and worse on the left fourth finger.  The patient had abscess drained in the emergency room and sent  for culture, admitted to the hospital, and underwent incision and debridement by Orthopedic Surgery.  The patient was started on vancomycin and Zosyn and continued until yesterday, culture came back MRSA positive, Zosyn was discontinued and remained on vancomycin.  At this point, he is getting his last dose of vancomycin and will be discharged on Bactrim for a total of 10 days.  The patient has Bactrim for about 8 days already at hand.  Additional prescription was ordered for 5 days.  Before he stops, he will discuss with his primary care provider or Orthopedic Surgery.  The patient is also advised to follow up with Infectious Disease, Dr. Jimenes, after 2 weeks to discuss regarding his recurrent MRSA infection and possible consideration of decolonization.  I discussed with the patient and his wife at bedside.  All their questions and concerns addressed, showed understanding and discharged.      Total time spent coordinating his discharge face-to-face was over 30 minutes.         PRINCESS LEE MD             D: 10/06/2018   T: 10/06/2018   MT: RABIA      Name:     HILARY GUERRA   MRN:      [id number]        Account:        [id number]   :      1970           Admit Date:     10/03/2018                                  Discharge Date:       Document: [id number]

## 2018-10-06 NOTE — PROGRESS NOTES
Orthopedic Surgery  Justice Person  10/6/2018  Admit Date:  10/3/2018  POD # 2  S/P L ring finger abscess I&D with excisional debridement of nonviable skin and subcutaneous tissues    Patient resting comfortably in bed.    Pain controlled.  Tolerating oral intake.    Denies nausea or vomiting  Denies chest pain or shortness of breath  No events overnight.        Alert and orient to person, place, and time.  Vital Sign Ranges  Temperature Temp  Av.5  F (35.8  C)  Min: 95.5  F (35.3  C)  Max: 97.1  F (36.2  C)   Blood pressure Systolic (24hrs), Av , Min:118 , Max:137        Diastolic (24hrs), Av, Min:65, Max:67      Pulse No Data Recorded   Respirations Resp  Av.7  Min: 16  Max: 18   Pulse oximetry SpO2  Av.3 %  Min: 96 %  Max: 97 %       Dressing is clean, dry, and intact.   Dressing was removed. Left 4th digit with erythema and mild edema to PIP joint.   Packing removed with small amount of purulent drainage. Sutures in place.   Patient able to fully extend digit at MCP, PIP, and DIP joints. Limited flexion.   He is able to make a fist.   Sensation intact.   + capillary refill.     Labs:  Recent Labs   Lab Test  10/05/18   0721  10/04/18   0624  10/03/18   1536   POTASSIUM  4.2  4.2  4.1     Recent Labs   Lab Test  10/04/18   0624  10/03/18   1536   HGB  15.6  16.7     No results for input(s): INR in the last 88038 hours.  Recent Labs   Lab Test  10/04/18   0624  10/03/18   1536   PLT  305  333       A/P  1. Plan   Continue antibiotics per ID.      Daily dressing change and wound soaks. Discussed with patient to soak left hand in Hibiclens + water for 15 minutes with gentle ROM of digits 2x daily.    Continue current pain regiment.    2. Disposition   Anticipate d/c to home based on ID and medical plans - ok for discharge to home per ortho standpoint.     Sofia Damon PA-C

## 2018-10-06 NOTE — PLAN OF CARE
Problem: Patient Care Overview  Goal: Plan of Care/Patient Progress Review  Outcome: Adequate for Discharge Date Met: 10/06/18  Pt discharged to home with wife. Discharge instructions given. Sent discharge medications with. Pt and wife had no further questions. VSS

## 2018-10-06 NOTE — PLAN OF CARE
Problem: Patient Care Overview  Goal: Plan of Care/Patient Progress Review  Outcome: Improving  A&O x4. VSS. LS CTA all fields. BS active x4. Dressing to L 4th digit is CDI, CMS intact. Dressing to R thigh is CDI, erythema receeding from border. Bandaids in place to both abdominal sites. L thigh culture came back positive for MRSA. Pt and wife given education materials regarding MRSA and started on contact precautions. Pt on IV vanco. inga PO well. up independently. Scheduled tylenol managing pain. voiding in good amts, plans to dc home today on PO abx if remains stable.

## 2018-10-08 LAB
BACTERIA SPEC CULT: ABNORMAL
Lab: ABNORMAL
SPECIMEN SOURCE: ABNORMAL

## 2018-10-11 LAB
BACTERIA SPEC CULT: NORMAL
Lab: NORMAL
SPECIMEN SOURCE: NORMAL

## 2018-10-15 ENCOUNTER — OFFICE VISIT (OUTPATIENT)
Dept: FAMILY MEDICINE | Facility: CLINIC | Age: 48
End: 2018-10-15
Payer: COMMERCIAL

## 2018-10-15 VITALS
OXYGEN SATURATION: 99 % | HEART RATE: 78 BPM | WEIGHT: 190 LBS | DIASTOLIC BLOOD PRESSURE: 68 MMHG | SYSTOLIC BLOOD PRESSURE: 122 MMHG | HEIGHT: 70 IN | TEMPERATURE: 97.9 F | BODY MASS INDEX: 27.2 KG/M2

## 2018-10-15 DIAGNOSIS — L03.314 CELLULITIS OF GROIN: ICD-10-CM

## 2018-10-15 DIAGNOSIS — A49.02 MRSA INFECTION: Primary | ICD-10-CM

## 2018-10-15 DIAGNOSIS — L98.9 SKIN LESION: ICD-10-CM

## 2018-10-15 DIAGNOSIS — L03.012 CELLULITIS OF LEFT FINGER: ICD-10-CM

## 2018-10-15 PROCEDURE — 99214 OFFICE O/P EST MOD 30 MIN: CPT | Performed by: PHYSICIAN ASSISTANT

## 2018-10-15 NOTE — PATIENT INSTRUCTIONS
Glad things are improving.  Continue bactrim without changes.  Will try and consult with ID prior to your appt on 10/29 to see if we should extend antibiotics until appt especially in light of 2 possible new spots.  Return for physical when things have gotten sorted out.

## 2018-10-15 NOTE — PROGRESS NOTES
SUBJECTIVE:   Justice Person is a 48 year old male who presents to clinic today for the following health issues:          Hospital Follow-up Visit:    Hospital/Nursing Home/IP Rehab Facility: [facility]  Date of Admission: 10/3/2018  Date of Discharge: 10/6/2018  Reason(s) for Admission: Left fourth finger abscess, status post irrigation and debridement, MRSA positive.   Acute renal injury - resolved on recheck in hospital.    Pt has no prior history of recurrent skin infections up until he was evaluated x3 in ED for soft tissues skin infections/cellulitis.  Last visit he was admitted for 4-days after not improving on vancomycin.  Ultimately was found to be MRSA positive and had abx regimen switched to bactrim and underwent I&D of L 4th finger and R groin.   Has taken bactrim consistently since then and reports that things have been doing well.  Does mention he has a new spot L sub-axillary thoracic region and L groin that just started 2 days ago.  Has started to treat this the same way he was instructed to treat the other areas.  Given iodosorb - cadexomer iodine gel from the wound nurse.    Following wound care instructions - doing twice daily soaks with hibiclens.   Continue to pack R groin abscess, but feels this is getting close to not needing it.   Was shown in the hospital how to change the packing daily.     Has specialty care visits all scheduled:  Orthopedic Surgery at [facility] on 10/18  Infectious Disease 10/29  Colorectal specialist 10/22    Has skin regimen with daily moisturizer - cetaphil    Still has 4-5 days left of bactrim, but wonders if he needs to continue.    Sochx:  so wonders if this is how he keeps getting exposed to these infections.               Problems taking medications regularly:  None       Medication changes since discharge: None       Problems adhering to non-medication therapy:  None    Summary of hospitalization:  [facility] discharge summary  reviewed  Diagnostic Tests/Treatments reviewed.  Follow up needed: none  Other Healthcare Providers Involved in Patient s Care: does have a follow-up scheduled with [facility] next week           None  Update since discharge:      Post Discharge Medication Reconciliation: discharge medications reconciled, continue medications without change.  Plan of care communicated with patient     Coding guidelines for this visit:  Type of Medical   Decision Making Face-to-Face Visit       within 7 Days of discharge Face-to-Face Visit        within 14 days of discharge   Moderate Complexity [id number]   High Complexity [id number]            Problem list and histories reviewed & adjusted, as indicated.  Additional history: as documented    Patient Active Problem List   Diagnosis     Upper back pain     CARDIOVASCULAR SCREENING; LDL GOAL LESS THAN 160     Abscess     Past Surgical History:   Procedure Laterality Date     IRRIGATION AND DEBRIDEMENT FINGER, COMBINED Left 10/4/2018    Procedure: COMBINED IRRIGATION AND DEBRIDEMENT FINGER;  Left ring finger abscess irrigation and debridement with excisional debridement of nonviable skin and subcutaneous tissues.;  Surgeon: Dilan Muñoz MD;  Location:  OR       Social History   Substance Use Topics     Smoking status: Never Smoker     Smokeless tobacco: Never Used     Alcohol use Yes     Family History   Problem Relation Age of Onset     Diabetes Maternal Grandmother      Cancer - colorectal No family hx of      Prostate Cancer No family hx of          Current Outpatient Prescriptions   Medication Sig Dispense Refill     acetaminophen (TYLENOL) 325 MG tablet Take 2 tablets (650 mg) by mouth every 4 hours as needed for other (multimodal surgical pain management along with NSAIDS and opioid medication as indicated based on pain control and physical function.) 100 tablet      chlorhexidine (HIBICLENS) 4 % liquid Apply topically daily as needed for wound care May use  "for bathing wounds 500 mL 3     sulfamethoxazole-trimethoprim (BACTRIM DS/SEPTRA DS) 800-160 MG per tablet Take 1 tablet by mouth 2 times daily 20 tablet 0     senna-docusate (SENOKOT-S;PERICOLACE) 8.6-50 MG per tablet Take 2 tablets by mouth 2 times daily (Patient not taking: Reported on 10/15/2018) 20 tablet 0     traMADol (ULTRAM) 50 MG tablet Take 1 tablet (50 mg) by mouth every 4 hours as needed for moderate pain (Patient not taking: Reported on 10/15/2018) 20 tablet 0     Allergies   Allergen Reactions     No Known Drug Allergies        Reviewed and updated as needed this visit by clinical staff       Reviewed and updated as needed this visit by Provider         ROS:  Constitutional, HEENT, cardiovascular, pulmonary, gi and gu, integumentary, MSK systems are negative, except as otherwise noted.    OBJECTIVE:     /68 (BP Location: Right arm, Cuff Size: Adult Regular)  Pulse 78  Temp 97.9  F (36.6  C) (Oral)  Ht 5' 10\" (1.778 m)  Wt 190 lb (86.2 kg)  SpO2 99%  BMI 27.26 kg/m2  Body mass index is 27.26 kg/(m^2).  GENERAL: healthy, alert and no distress  MS: L dorsal 4th finger with scabbed area and 2 sutures in place from prior I&D noted over PIPJ. This has a halo of nice pink healing skin. There is no pustular discharge, cellulitis or any appreciable fluctuance. Pt able to flex/extend finger without limitation excluding with extreme flexion.  SKIN: Multiple areas of healing previous superficial soft tissue abscess and skin infections includin) R medial groin - small amount of packing still in place, but this is now down to less than 1cm. No fluctuance, induration or active pustular discharge  2) Healed over incisional site R suprapubic region without signs of infection  3) 1cm small macule just superior to R groin and another 1cm along distal R abdomen      Of concern pt shows me 2 new spots that are starting out like his other infections did:  1) L subaxillary region. This is a tiny slightly " erythematous macule though that appears to be resolving. No cellulitis, fluctuance or drainage.  2) Slightly erythematous papule L suprapubic region.    Diagnostic Test Results:  none     ASSESSMENT/PLAN:         ICD-10-CM    1. MRSA infection A49.02    2. Cellulitis of left finger L03.012    3. Cellulitis of groin L03.314    4. Skin lesions - 2 new that appear to be starting same way as previous per pt. L98.9    Stable/improved and doing well since hospital discharge excluding pt reports 2 new spots that are reminiscent of starting same way his previous infections did. Has 4-5 days of bactrim left, but will consult with I&D prior to appt as may require continued abx until he is able to be seen on 10/29.  See Patient Instructions  Pt in agreement with plan.   Patient Instructions   Glad things are improving.  Continue bactrim without changes.  Will try and consult with ID prior to your appt on 10/29 to see if we should extend antibiotics until appt especially in light of 2 possible new spots.  Return for physical when things have gotten sorted out.    Debbie Osuna PA-C  [facility]    ADDENDUM 10/17-------  Spoke to RN of infectious disease specialist Dr. Jimenes. They recommend continuation of bactrim until appt and use of topical mupirocin. They will contact pt and send prescriptions.  Electronically Signed By: Debbie Osuna PA-C      ADDENDUM 10/18------  Called pt and confirmed he received instructions from I&D as noted above. He has no further questions and will follow-up as planned.  Electronically Signed By: Debbie Osuna PA-C

## 2018-10-15 NOTE — MR AVS SNAPSHOT
After Visit Summary   10/15/2018    Justice Person    MRN: [id number]           Patient Information     Date Of Birth          1970        Visit Information        Provider Department      10/15/2018 4:20 PM Debbie Osuna PA-C [facility]        Today's Diagnoses     MRSA infection    -  1    Cellulitis of left finger        Cellulitis of groin          Care Instructions    Glad things are improving.  Continue bactrim without changes.  Will try and consult with ID prior to your appt on 10/29 to see if we should extend antibiotics until appt especially in light of 2 possible new spots.  Return for physical when things have gotten sorted out.          Follow-ups after your visit        Follow-up notes from your care team     Return in about 2 months (around 12/15/2018) for Routine Visit, Physical Exam.      Your next 10 appointments already scheduled     Dec 10, 2018   Procedure with Teagan Mitchell MD   [facility] ([facility])    [address] [place] [address] [phone]           [facility] is located at [address] [place]              Who to contact     If you have questions or need follow up information about today's clinic visit or your schedule please contact [facility] directly at [phone].  Normal or non-critical lab and imaging results will be communicated to you by MyChart, letter or phone within 4 business days after the clinic has received the results. If you do not hear from us within 7 days, please contact the clinic through MyChart or phone. If you have a critical or abnormal lab result, we will notify you by phone as soon as possible.  Submit refill requests through [employer] or call your pharmacy and they will forward the refill request to us. Please allow 3 business days for your refill to be completed.          Additional Information About Your Visit       "  Care EveryWhere ID     This is your Care EveryWhere ID. This could be used by other organizations to access your [facility] medical records  [id number]        Your Vitals Were     Pulse Temperature Height Pulse Oximetry BMI (Body Mass Index)       78 97.9  F (36.6  C) (Oral) 5' 10\" (1.778 m) 99% 27.26 kg/m2        Blood Pressure from Last 3 Encounters:   10/15/18 122/68   10/06/18 148/71   10/01/18 120/82    Weight from Last 3 Encounters:   10/15/18 190 lb (86.2 kg)   10/03/18 195 lb (88.5 kg)   09/17/18 194 lb (88 kg)              Today, you had the following     No orders found for display         Today's Medication Changes          These changes are accurate as of 10/15/18  5:18 PM.  If you have any questions, ask your nurse or doctor.               These medicines have changed or have updated prescriptions.        Dose/Directions    sulfamethoxazole-trimethoprim 800-160 MG per tablet   Commonly known as:  BACTRIM DS/SEPTRA DS   This may have changed:  Another medication with the same name was removed. Continue taking this medication, and follow the directions you see here.   Used for:  Cellulitis of right thigh, Cellulitis of left ring finger   Changed by:  Debbie Osuna PA-C        Dose:  1 tablet   Take 1 tablet by mouth 2 times daily   Quantity:  20 tablet   Refills:  0                Primary Care Provider Office Phone # Fax #    Juju SHAKIRA Victor [phone]       [facility] [id number]        Equal Access to Services     ISELA WATERS : Hadii miki langford hadasho Soomaali, waaxda luqadaha, qaybta kaalmada adeegyada, noe cisneros. So [facility] [phone].    ATENCIÓN: Si habla español, tiene a orellana disposición servicios gratuitos de asistencia lingüística. Llame al [phone].    We comply with applicable federal civil rights laws and [place] laws. We do not discriminate on the basis of race, color, national origin, " age, disability, sex, sexual orientation, or gender identity.            Thank you!     Thank you for choosing [facility]  for your care. Our goal is always to provide you with excellent care. Hearing back from our patients is one way we can continue to improve our services. Please take a few minutes to complete the written survey that you may receive in the mail after your visit with us. Thank you!             Your Updated Medication List - Protect others around you: Learn how to safely use, store and throw away your medicines at www.disposemymeds.org.          This list is accurate as of 10/15/18  5:18 PM.  Always use your most recent med list.                   Brand Name Dispense Instructions for use Diagnosis    acetaminophen 325 MG tablet    TYLENOL    100 tablet    Take 2 tablets (650 mg) by mouth every 4 hours as needed for other (multimodal surgical pain management along with NSAIDS and opioid medication as indicated based on pain control and physical function.)    Cellulitis of finger of left hand       chlorhexidine 4 % liquid    HIBICLENS    500 mL    Apply topically daily as needed for wound care May use for bathing wounds    Cellulitis of right thigh, Cellulitis of left ring finger, Folliculitis       senna-docusate 8.6-50 MG per tablet    SENOKOT-S;PERICOLACE    20 tablet    Take 2 tablets by mouth 2 times daily    Cellulitis of finger of left hand       sulfamethoxazole-trimethoprim 800-160 MG per tablet    BACTRIM DS/SEPTRA DS    20 tablet    Take 1 tablet by mouth 2 times daily    Cellulitis of right thigh, Cellulitis of left ring finger       traMADol 50 MG tablet    ULTRAM    20 tablet    Take 1 tablet (50 mg) by mouth every 4 hours as needed for moderate pain    Cellulitis of finger of left hand

## 2018-12-10 ENCOUNTER — HOSPITAL ENCOUNTER (OUTPATIENT)
Facility: CLINIC | Age: 48
Discharge: HOME | End: 2018-12-10
Attending: COLON & RECTAL SURGERY | Admitting: COLON & RECTAL SURGERY
Payer: COMMERCIAL

## 2018-12-10 VITALS
DIASTOLIC BLOOD PRESSURE: 73 MMHG | RESPIRATION RATE: 14 BRPM | SYSTOLIC BLOOD PRESSURE: 111 MMHG | WEIGHT: 190 LBS | BODY MASS INDEX: 27.2 KG/M2 | OXYGEN SATURATION: 98 % | HEIGHT: 70 IN

## 2018-12-10 LAB — COLONOSCOPY: NORMAL

## 2018-12-10 PROCEDURE — 45378 DIAGNOSTIC COLONOSCOPY: CPT | Performed by: COLON & RECTAL SURGERY

## 2018-12-10 PROCEDURE — G0500 MOD SEDAT ENDO SERVICE >5YRS: HCPCS | Performed by: COLON & RECTAL SURGERY

## 2018-12-10 PROCEDURE — G0121 COLON CA SCRN NOT HI RSK IND: HCPCS | Performed by: COLON & RECTAL SURGERY

## 2018-12-10 PROCEDURE — 25000128 H RX IP 250 OP 636: Performed by: COLON & RECTAL SURGERY

## 2018-12-10 RX ORDER — ONDANSETRON 2 MG/ML
4 INJECTION INTRAMUSCULAR; INTRAVENOUS
Status: DISCONTINUED | OUTPATIENT
Start: 2018-12-10 | End: 2018-12-10 | Stop reason: HOSPADM

## 2018-12-10 RX ORDER — FENTANYL CITRATE 50 UG/ML
INJECTION, SOLUTION INTRAMUSCULAR; INTRAVENOUS PRN
Status: DISCONTINUED | OUTPATIENT
Start: 2018-12-10 | End: 2018-12-10 | Stop reason: HOSPADM

## 2018-12-10 RX ORDER — ONDANSETRON 4 MG/1
4 TABLET, ORALLY DISINTEGRATING ORAL EVERY 6 HOURS PRN
Status: DISCONTINUED | OUTPATIENT
Start: 2018-12-10 | End: 2018-12-10 | Stop reason: HOSPADM

## 2018-12-10 RX ORDER — NALOXONE HYDROCHLORIDE 0.4 MG/ML
.1-.4 INJECTION, SOLUTION INTRAMUSCULAR; INTRAVENOUS; SUBCUTANEOUS
Status: DISCONTINUED | OUTPATIENT
Start: 2018-12-10 | End: 2018-12-10 | Stop reason: HOSPADM

## 2018-12-10 RX ORDER — ONDANSETRON 2 MG/ML
4 INJECTION INTRAMUSCULAR; INTRAVENOUS EVERY 6 HOURS PRN
Status: DISCONTINUED | OUTPATIENT
Start: 2018-12-10 | End: 2018-12-10 | Stop reason: HOSPADM

## 2018-12-10 RX ORDER — FLUMAZENIL 0.1 MG/ML
0.2 INJECTION, SOLUTION INTRAVENOUS
Status: DISCONTINUED | OUTPATIENT
Start: 2018-12-10 | End: 2018-12-10 | Stop reason: HOSPADM

## 2018-12-10 RX ORDER — LIDOCAINE 40 MG/G
CREAM TOPICAL
Status: DISCONTINUED | OUTPATIENT
Start: 2018-12-10 | End: 2018-12-10 | Stop reason: HOSPADM

## 2018-12-10 SDOH — HEALTH STABILITY: MENTAL HEALTH: HOW OFTEN DO YOU HAVE A DRINK CONTAINING ALCOHOL?: NEVER

## 2018-12-10 ASSESSMENT — MIFFLIN-ST. JEOR: SCORE: 1728.08

## 2018-12-10 NOTE — H&P
Pre-Endoscopy History and Physical     Justice Person MRN# [id number]   YOB: 1970 Age: 48 year old     Date of Procedure: 12/10/2018  Primary care provider: Yany Hair  Type of Endoscopy: colonoscopy  Reason for Procedure: screening  Type of Anesthesia Anticipated: Moderate Sedation    HPI:    Justice is a 48 year old male who will be undergoing the above procedure.      A history and physical has been performed. The patient's medications and allergies have been reviewed. The risks and benefits of the procedure and the sedation options and risks were discussed with the patient.  All questions were answered and informed consent was obtained.      He denies a personal or family history of anesthesia complications or bleeding disorders.     Allergies   Allergen Reactions     No Known Drug Allergies         Prior to Admission Medications   Prescriptions Last Dose Informant Patient Reported? Taking?   acetaminophen (TYLENOL) 325 MG tablet   No No   Sig: Take 2 tablets (650 mg) by mouth every 4 hours as needed for other (multimodal surgical pain management along with NSAIDS and opioid medication as indicated based on pain control and physical function.)   chlorhexidine (HIBICLENS) 4 % liquid   No No   Sig: Apply topically daily as needed for wound care May use for bathing wounds   senna-docusate (SENOKOT-S;PERICOLACE) 8.6-50 MG per tablet   No No   Sig: Take 2 tablets by mouth 2 times daily   Patient not taking: Reported on 10/15/2018   sulfamethoxazole-trimethoprim (BACTRIM DS/SEPTRA DS) 800-160 MG per tablet   No No   Sig: Take 1 tablet by mouth 2 times daily   traMADol (ULTRAM) 50 MG tablet   No No   Sig: Take 1 tablet (50 mg) by mouth every 4 hours as needed for moderate pain   Patient not taking: Reported on 10/15/2018      Facility-Administered Medications: None       Patient Active Problem List   Diagnosis     Upper back pain     CARDIOVASCULAR SCREENING; LDL GOAL LESS THAN 160     Abscess  "       Past Medical History:   Diagnosis Date     Abscess      Cellulitis         Past Surgical History:   Procedure Laterality Date     IRRIGATION AND DEBRIDEMENT FINGER, COMBINED Left 10/4/2018    Procedure: COMBINED IRRIGATION AND DEBRIDEMENT FINGER;  Left ring finger abscess irrigation and debridement with excisional debridement of nonviable skin and subcutaneous tissues.;  Surgeon: Dilan Muñoz MD;  Location:  OR       Social History     Tobacco Use     Smoking status: Never Smoker     Smokeless tobacco: Never Used   Substance Use Topics     Alcohol use: No     Frequency: Never       Family History   Problem Relation Age of Onset     Diabetes Maternal Grandmother      Cancer - colorectal No family hx of      Prostate Cancer No family hx of      Colon Cancer No family hx of        REVIEW OF SYSTEMS:     5 point ROS negative except as noted above in HPI, including Gen., Resp., CV, GI &  system review.      PHYSICAL EXAM:   Ht 1.778 m (5' 10\")   Wt 86.2 kg (190 lb)   BMI 27.26 kg/m   Estimated body mass index is 27.26 kg/m  as calculated from the following:    Height as of this encounter: 1.778 m (5' 10\").    Weight as of this encounter: 86.2 kg (190 lb).   GENERAL APPEARANCE: healthy and alert  MENTAL STATUS: alert  AIRWAY EXAM: Mallampatti Class II (visualization of the soft palate, fauces, and uvula)  RESP: lungs clear to auscultation - no rales, rhonchi or wheezes  CV: regular rates and rhythm      DIAGNOSTICS:    Not indicated      IMPRESSION   ASA Class 2 - Mild systemic disease        PLAN:       Plan for colonoscopy. We discussed the risks, benefits and alternatives and the patient wished to proceed.    The above has been forwarded to the consulting provider.      Signed Electronically by: Teagan Mitchell MD  December 10, 2018    "

## 2018-12-10 NOTE — LETTER
[address], [place] [address] [phone]   December 10, 2018    Justice Person  [address] [facility] [address]      Dear Justice,    Here is a summary of your recent test results:    Colonoscopy was normal. This should be repeated in 10 years for routine surveillance.     Your test results are enclosed.      Please contact me if you have any questions.             Thank you very much for trusting [facility] .     Healthy regards,  Debbie Osuna PA-C            Results for orders placed or performed during the hospital encounter of 12/10/18   COLONOSCOPY   Result Value Ref Range    COLONOSCOPY       [facility]  _______________________________________________________________________________  Patient Name: Justice MOYA Naya        Procedure Date: 12/10/2018 10:17 AM  MRN: [id number]                       Account Number: [id number]  YOB: 1970              Admit Type: Outpatient  Age: 48                               Gender: Male  Attending MD: Teagan Mitchell MD        Total Sedation Time: 17 minutes of   continuous bedside 1:1. IT:5m, WDt: 10m  Instrument Name: 220 - Pediatric Colonoscope   _______________________________________________________________________________     Procedure:                Colonoscopy  Indications:              Screening for colorectal malignant neoplasm  Providers:                Teagan Mitchell MD (Doctor)  Referring MD:             Debbie Osuna MD (Referring                             MD)  Medicines:                Fentanyl 100 micrograms IV, Midazolam 2 mg IV  Complications:            No immediate compli cations.  _______________________________________________________________________________  Procedure:                Pre-Anesthesia Assessment:                            - Prior to the procedure, a History and Physical                              was performed, and patient medications and                             allergies were reviewed. The patient is competent.                             The risks and benefits of the procedure and the                             sedation options and risks were discussed with the                             patient. All questions were answered and informed                             consent was obtained. Patient identification and                             proposed procedure were verified by the physician                             and the nurse in the endoscopy suite. Mental Status                             Examination: alert and oriented. Airway                             Examination: normal oropharyngeal airway and neck                              mobility. Respiratory Examination: clear to                             auscultation. CV Examination: normal. Prophylactic                             Antibiotics: The patient does not require                             prophylactic antibiotics. Prior Anticoagulants: The                             patient has taken no previous anticoagulant or                             antiplatelet agents. ASA Grade Assessment: II - A                             patient with mild systemic disease. After reviewing                             the risks and benefits, the patient was deemed in                             satisfactory condition to undergo the procedure.                             The anesthesia plan was to use moderate sedation /                             analgesia (conscious sedation). Immediately prior                             to administration of medications, the patient was                             re-assessed for adequacy to receive sedatives. The                              heart rate, respiratory rate, oxygen saturations,                             blood pressure, adequacy of pulmonary ventilation,                             and response to  care were monitored throughout the                             procedure. The physical status of the patient was                             re-assessed after the procedure.                            After obtaining informed consent, the colonoscope                             was passed under direct vision. Throughout the                             procedure, the patient's blood pressure, pulse, and                             oxygen saturations were monitored continuously. The                             Olympus, Pediatric Colonoscope, Model # [id number]-H190DL,                             [place] # 220, SN # [id number] was introduced through                             the anus and advanced to 5 cm into the ileum. The                             colonoscopy was performed with ease. The patient                              tolerated the procedure well. The quality of the                             bowel preparation was excellent.                                                                                   Findings:       Skin tags were found on perianal exam.       The digital rectal exam was normal. Pertinent negatives include normal        sphincter tone and no palpable rectal lesions. The prostate is enlarged        but smooth.       The terminal ileum appeared normal.       The retroflexed view of the distal rectum and anal verge was normal and        showed no anal or rectal abnormalities.                                                                                   Impression:               - Perianal skin tags found on perianal exam.                            - The examined portion of the ileum was normal.                            - The distal rectum and anal verge are normal on                             retroflexion view.                            - No specimens col lected.  Recommendation:           - Repeat colonoscopy in 10 years for screening                             purposes.                                                                                    Procedure Code(s):       --- Professional ---       02644, Colonoscopy, flexible; diagnostic, including collection of        specimen(s) by brushing or washing, when performed (separate procedure)  Diagnosis Code(s):       --- Professional ---       Z12.11, Encounter for screening for malignant neoplasm of colon       K64.4, Residual hemorrhoidal skin tags    CPT copyright 2017 American Medical Association. All rights reserved.    The codes documented in this report are preliminary and upon  review may   be revised to meet current compliance requirements.      _______________  Teagan Mitchell MD  12/10/2018 10:51:16 AM  I was physically present for the entire viewing portion of the exam.  __________________________Teagan Mitchell MD  Number of Addenda: 0    Note Initiated On: 12/10/2018  10:17 AM  MRN:                      [id number]  Procedure Date:           12/10/2018 10:17:43 AM  Scope Withdrawal Time: 0 hours 10 minutes 16 seconds   Total Procedure Duration: 0 hours 15 minutes 22 seconds   Estimated Blood Loss:       Scope In: 10:27:20 AM  Scope Out: 10:42:42 AM

## 2018-12-10 NOTE — RESULT ENCOUNTER NOTE
Please send patient letter.    Dear Justice,      Your recent test results are noted below:    -Colonoscopy was normal. This should be repeated in 10 years for routine surveillance.     Please contact the clinic at [phone] with any further questions or concerns.      Sincerely,      Debbie Osuna PA-C  [facility]

## 2019-05-23 ENCOUNTER — HOSPITAL ENCOUNTER (INPATIENT)
Facility: CLINIC | Age: 49
LOS: 3 days | Discharge: HOME | End: 2019-05-26
Attending: EMERGENCY MEDICINE | Admitting: INTERNAL MEDICINE
Payer: COMMERCIAL

## 2019-05-23 ENCOUNTER — APPOINTMENT (OUTPATIENT)
Dept: ULTRASOUND IMAGING | Facility: CLINIC | Age: 49
End: 2019-05-23
Attending: EMERGENCY MEDICINE
Payer: COMMERCIAL

## 2019-05-23 DIAGNOSIS — L03.115 CELLULITIS OF RIGHT THIGH: ICD-10-CM

## 2019-05-23 DIAGNOSIS — L03.314 CELLULITIS OF LEFT GROIN: Primary | ICD-10-CM

## 2019-05-23 DIAGNOSIS — L03.012 CELLULITIS OF LEFT RING FINGER: ICD-10-CM

## 2019-05-23 DIAGNOSIS — L03.314 CELLULITIS OF GROIN, LEFT: ICD-10-CM

## 2019-05-23 LAB
ANION GAP SERPL CALCULATED.3IONS-SCNC: 7 MMOL/L (ref 3–14)
BASOPHILS # BLD AUTO: 0 10E9/L (ref 0–0.2)
BASOPHILS NFR BLD AUTO: 0.4 %
BUN SERPL-MCNC: 22 MG/DL (ref 7–30)
CALCIUM SERPL-MCNC: 8.6 MG/DL (ref 8.5–10.1)
CHLORIDE SERPL-SCNC: 105 MMOL/L (ref 94–109)
CO2 SERPL-SCNC: 27 MMOL/L (ref 20–32)
CREAT SERPL-MCNC: 1.38 MG/DL (ref 0.66–1.25)
DIFFERENTIAL METHOD BLD: NORMAL
EOSINOPHIL # BLD AUTO: 0.3 10E9/L (ref 0–0.7)
EOSINOPHIL NFR BLD AUTO: 3.6 %
ERYTHROCYTE [DISTWIDTH] IN BLOOD BY AUTOMATED COUNT: 12.8 % (ref 10–15)
GFR SERPL CREATININE-BSD FRML MDRD: 59 ML/MIN/{1.73_M2}
GLUCOSE SERPL-MCNC: 94 MG/DL (ref 70–99)
HCT VFR BLD AUTO: 47.1 % (ref 40–53)
HGB BLD-MCNC: 15.8 G/DL (ref 13.3–17.7)
IMM GRANULOCYTES # BLD: 0 10E9/L (ref 0–0.4)
IMM GRANULOCYTES NFR BLD: 0.1 %
LYMPHOCYTES # BLD AUTO: 1.3 10E9/L (ref 0.8–5.3)
LYMPHOCYTES NFR BLD AUTO: 17.4 %
MCH RBC QN AUTO: 30.5 PG (ref 26.5–33)
MCHC RBC AUTO-ENTMCNC: 33.5 G/DL (ref 31.5–36.5)
MCV RBC AUTO: 91 FL (ref 78–100)
MONOCYTES # BLD AUTO: 0.5 10E9/L (ref 0–1.3)
MONOCYTES NFR BLD AUTO: 7 %
NEUTROPHILS # BLD AUTO: 5.4 10E9/L (ref 1.6–8.3)
NEUTROPHILS NFR BLD AUTO: 71.5 %
NRBC # BLD AUTO: 0 10*3/UL
NRBC BLD AUTO-RTO: 0 /100
PLATELET # BLD AUTO: 263 10E9/L (ref 150–450)
POTASSIUM SERPL-SCNC: 3.8 MMOL/L (ref 3.4–5.3)
RBC # BLD AUTO: 5.18 10E12/L (ref 4.4–5.9)
SODIUM SERPL-SCNC: 139 MMOL/L (ref 133–144)
WBC # BLD AUTO: 7.5 10E9/L (ref 4–11)

## 2019-05-23 PROCEDURE — 36415 COLL VENOUS BLD VENIPUNCTURE: CPT | Performed by: EMERGENCY MEDICINE

## 2019-05-23 PROCEDURE — 25000128 H RX IP 250 OP 636: Performed by: EMERGENCY MEDICINE

## 2019-05-23 PROCEDURE — 87040 BLOOD CULTURE FOR BACTERIA: CPT | Performed by: EMERGENCY MEDICINE

## 2019-05-23 PROCEDURE — 0JB80ZZ EXCISION OF ABDOMEN SUBCUTANEOUS TISSUE AND FASCIA, OPEN APPROACH: ICD-10-PCS | Performed by: SURGERY

## 2019-05-23 PROCEDURE — 99223 1ST HOSP IP/OBS HIGH 75: CPT | Mod: AI | Performed by: INTERNAL MEDICINE

## 2019-05-23 PROCEDURE — 12000000 ZZH R&B MED SURG/OB

## 2019-05-23 PROCEDURE — 85025 COMPLETE CBC W/AUTO DIFF WBC: CPT | Performed by: EMERGENCY MEDICINE

## 2019-05-23 PROCEDURE — 80048 BASIC METABOLIC PNL TOTAL CA: CPT | Performed by: EMERGENCY MEDICINE

## 2019-05-23 PROCEDURE — 25800030 ZZH RX IP 258 OP 636: Performed by: EMERGENCY MEDICINE

## 2019-05-23 PROCEDURE — 96374 THER/PROPH/DIAG INJ IV PUSH: CPT

## 2019-05-23 PROCEDURE — 25800030 ZZH RX IP 258 OP 636: Performed by: INTERNAL MEDICINE

## 2019-05-23 PROCEDURE — 99285 EMERGENCY DEPT VISIT HI MDM: CPT | Mod: 25

## 2019-05-23 PROCEDURE — 76882 US LMTD JT/FCL EVL NVASC XTR: CPT | Mod: LT

## 2019-05-23 RX ORDER — CEFAZOLIN SODIUM 1 G/50ML
1750 SOLUTION INTRAVENOUS ONCE
Status: COMPLETED | OUTPATIENT
Start: 2019-05-23 | End: 2019-05-23

## 2019-05-23 RX ORDER — NALOXONE HYDROCHLORIDE 0.4 MG/ML
.1-.4 INJECTION, SOLUTION INTRAMUSCULAR; INTRAVENOUS; SUBCUTANEOUS
Status: DISCONTINUED | OUTPATIENT
Start: 2019-05-23 | End: 2019-05-26 | Stop reason: HOSPADM

## 2019-05-23 RX ORDER — ONDANSETRON 2 MG/ML
4 INJECTION INTRAMUSCULAR; INTRAVENOUS EVERY 6 HOURS PRN
Status: DISCONTINUED | OUTPATIENT
Start: 2019-05-23 | End: 2019-05-26 | Stop reason: HOSPADM

## 2019-05-23 RX ORDER — ONDANSETRON 4 MG/1
4 TABLET, ORALLY DISINTEGRATING ORAL EVERY 6 HOURS PRN
Status: DISCONTINUED | OUTPATIENT
Start: 2019-05-23 | End: 2019-05-26 | Stop reason: HOSPADM

## 2019-05-23 RX ORDER — SODIUM CHLORIDE 9 MG/ML
INJECTION, SOLUTION INTRAVENOUS CONTINUOUS
Status: DISCONTINUED | OUTPATIENT
Start: 2019-05-23 | End: 2019-05-24

## 2019-05-23 RX ORDER — ACETAMINOPHEN 325 MG/1
650 TABLET ORAL EVERY 4 HOURS PRN
Status: DISCONTINUED | OUTPATIENT
Start: 2019-05-23 | End: 2019-05-26 | Stop reason: HOSPADM

## 2019-05-23 RX ORDER — CEFAZOLIN SODIUM 1 G/50ML
1750 SOLUTION INTRAVENOUS EVERY 12 HOURS
Status: DISCONTINUED | OUTPATIENT
Start: 2019-05-24 | End: 2019-05-25

## 2019-05-23 RX ADMIN — VANCOMYCIN HYDROCHLORIDE 1750 MG: 5 INJECTION, POWDER, LYOPHILIZED, FOR SOLUTION INTRAVENOUS at 18:48

## 2019-05-23 RX ADMIN — SODIUM CHLORIDE: 9 INJECTION, SOLUTION INTRAVENOUS at 21:06

## 2019-05-23 ASSESSMENT — ENCOUNTER SYMPTOMS
NAUSEA: 0
DIFFICULTY URINATING: 0
FEVER: 0
WOUND: 1
VOMITING: 0
DYSURIA: 0
CHILLS: 0

## 2019-05-23 ASSESSMENT — MIFFLIN-ST. JEOR
SCORE: 1728.08
SCORE: 1747.59

## 2019-05-23 NOTE — ED TRIAGE NOTES
Patient presents with left lower abdominal wound for the past 5 days. Patient states he had an infection that was similar back in October that was MRSA. ABCDS intact, alert and oriented x 4.

## 2019-05-23 NOTE — ED NOTES
"[facility]  ED Nurse Handoff Report    Justice Person is a 48 year old male   ED Chief complaint: Wound Check  . ED Diagnosis:   Final diagnoses:   Cellulitis of groin, left     Allergies:   Allergies   Allergen Reactions     No Known Drug Allergies        Code Status: Full Code  Activity level - Baseline/Home:  Independent. Activity Level - Current:   Independent. Lift room needed: No. Bariatric: No   Needed: No   Isolation: Yes. Infection: Not Applicable  MRSA.     Vital Signs:   Vitals:    05/23/19 1542 05/23/19 1800   BP: (!) 147/91 128/69   Pulse:  61   Resp: 14    Temp: 98  F (36.7  C)    TempSrc: Oral    SpO2: 99% 95%   Weight: 86.2 kg (190 lb)    Height: 1.778 m (5' 10\")        Cardiac Rhythm:  ,      Pain level: 0-10 Pain Scale: 4  Patient confused: No. Patient Falls Risk: No.   Elimination Status: Has voided   Patient Report - Initial Complaint: Wound Check. Focused Assessment:     Patient presents with left lower abdominal wound for the past 5 days. Patient states he had an infection that was similar back in October that was MRSA. ABCDS intact, alert and oriented x 4.        Tests Performed:   US Extremity Non Vascular Left   Final Result   IMPRESSION: Focused ultrasound scanning in the area of concern in the   left groin demonstrates a complex nonvascular 1.2 x 1.4 x 0.7 cm   structure with surrounding hyperemia that likely represents a   phlegmon. Drainable fluid is not present. A few regional,   morphological lymph nodes are noted.      ROYAL LOUIE MD      . Abnormal Results:   Labs Ordered and Resulted from Time of ED Arrival Up to the Time of Departure from the ED   BASIC METABOLIC PANEL - Abnormal; Notable for the following components:       Result Value    Creatinine 1.38 (*)     GFR Estimate 59 (*)     All other components within normal limits   CBC WITH PLATELETS DIFFERENTIAL   BLOOD CULTURE   BLOOD CULTURE   .   Treatments provided: Abx, US, labs   Family Comments: " Wife, Barbara, at bedside   OBS brochure/video discussed/provided to patient:  No  ED Medications:   Medications   vancomycin (VANCOCIN) 1,750 mg in sodium chloride 0.9 % 500 mL intermittent infusion (has no administration in time range)     Drips infusing:  Yes, vanco.   For the majority of the shift, the patient's behavior Green. Interventions performed were N/A.     Severe Sepsis OR Septic Shock Diagnosis Present: No      ED Nurse Name/Phone Number: Alcira Murillo,   6:30 PM    RECEIVING UNIT ED HANDOFF REVIEW    Above ED Nurse Handoff Report was reviewed: Yes  Reviewed by: Ayala Toledo on May 23, 2019 at 7:48 PM

## 2019-05-23 NOTE — ED PROVIDER NOTES
"  History     Chief Complaint:  Wound Check    HPI   Justice Person is a 48 year old male who presents to the emergency department today for a wound check. The patient endorses a history of a sore infected with MRSA in the same area on the left upper leg in October of 2018. He states on Sunday, 5/19/19, he noticed a sore again and started taking Bactrim again on Monday 5/20/19. The patient denies nausea, vomiting, testicular pain, dysuria, difficulty urinating, fevers, or chills.    Allergies:  No Known Drug Allergies      Medications:    Chlorhexidine  Senokot  Bactrim  Ultram     Past Medical History:    Abscess   Cellulitis  MRSA infection    Past Surgical History:    Irrigation and debridement of finger    Family History:    Maternal Grandmother: Diabetes    Social History:  The patient was accompanied to the ED by his wife.  Smoking Status: Never Smoker  Smokeless Tobacco: Never Used  Alcohol Use: Negative   Marital Status:        Review of Systems   Constitutional: Negative for chills and fever.   Gastrointestinal: Negative for nausea and vomiting.   Genitourinary: Negative for difficulty urinating, dysuria and testicular pain.   Skin: Positive for wound.   All other systems reviewed and are negative.      Physical Exam     Patient Vitals for the past 24 hrs:   BP Temp Temp src Pulse Heart Rate Resp SpO2 Height Weight   05/23/19 1800 128/69 -- -- 61 -- -- 95 % -- --   05/23/19 1542 (!) 147/91 98  F (36.7  C) Oral -- 59 14 99 % 1.778 m (5' 10\") 86.2 kg (190 lb)      Physical Exam  Constitutional: Alert, attentive  HENT:    Nose: Nose normal.    Mouth/Throat: Oropharynx is clear, mucous membranes are moist  Eyes: EOM are normal, anicteric, conjugate gaze  CV: regular rate and rhythm; no murmurs  Chest: Effort normal and breath sounds clear without wheezing or rales, symmetric bilaterally   GI:  non tender. No distension. No guarding or rebound.    MSK: No LE edema, no tenderness to palpation of BLE. "   Neurological: Alert, attentive, moving all extremities equally.   Skin: Skin is warm and dry. Indurated erythematous nodule in the left groin with surrounding outline erythema, no significant tenderness, no testicular or scrotal swelling or tenderness    Emergency Department Course     Imaging:  Radiology findings were communicated with the patient who voiced understanding of the findings.    US Extremity Non Vascular Left  Focused ultrasound scanning in the area of concern in the  left groin demonstrates a complex nonvascular 1.2 x 1.4 x 0.7 cm  structure with surrounding hyperemia that likely represents a  phlegmon. Drainable fluid is not present. A few regional,  morphological lymph nodes are noted.  ROYAL LOUIE MD  Reading per radiology      Laboratory:  Laboratory findings were communicated with the patient who voiced understanding of the findings.    CBC: WBC 7.5, HGB 15.8,   BMP: GFR 59 (L) , o/w WNL (Creatinine 1.38 (H))  Blood culture: Pending    Interventions:  1848 Vancomycin 1750 mg IV    Emergency Department Course:    1605 Nursing notes and vitals reviewed.    1612 I performed an exam of the patient as documented above.     1644 IV was inserted and blood was drawn for laboratory testing, results above.     1704 The patient was sent for a US extremity while in the emergency department, results above.      1706 I spoke with Dr. Hernández of the Infectious Disease service regarding patient's presentation, findings, and plan of care.     1925 I spoke with Dr. Villegas of the Hospitalist service regarding patient's presentation, findings, and plan of care.     1930 I personally reviewed the laboratory and imaging results with the patient and answered all related questions prior to admission.     Impression & Plan      Medical Decision Making:  Justice Person is a 48 year old male with a history of community acquired MRSA of his left inguinal crease treated with IV antibiotics back in October with  standing order from Dr. Hidalgo with infectious disease for bactrim should he have recurrence of hi sinfection who presents to the emergency department today for evaluation of left inguinal redness and swelling. His exam is consistent with cellulitis that has spread outside the margins of his previous demarcation despite 4 days of bactrim at home. He denies any systemic signs of infection, has a normal white count. Ultrasound was obtained and shows cobblestoning consistent with cellulitis but no overt target for incision and drainage.  No evidence of infection extending to the scrotum. Low suspicion for gangrene. Imaging deferred. I discussed the case with Dr. Hernández who agreed the patient would benefit from IV antibiotics.He was initiated on IV vancomycin and admitted to the hospital for further management.    Diagnosis:    ICD-10-CM    1. Cellulitis of groin, left L03.314 Blood culture     Blood culture     Basic metabolic panel (BMP)     Disposition:   The patient is admitted into the care of Dr. Villegas.     Zhen Millan MD   [employer]  11:32 PM 05/23/19     Scribe Disclosure:  I, Whit King, am serving as a scribe at 4:05 PM on 5/23/2019 to document services personally performed by Zhen Millan MD based on my observations and the provider's statements to me.         [facility] EMERGENCY DEPARTMENT       Zhen Millan MD  05/23/19 7196

## 2019-05-24 LAB
ANION GAP SERPL CALCULATED.3IONS-SCNC: 3 MMOL/L (ref 3–14)
BASOPHILS # BLD AUTO: 0 10E9/L (ref 0–0.2)
BASOPHILS NFR BLD AUTO: 0.6 %
BUN SERPL-MCNC: 16 MG/DL (ref 7–30)
CALCIUM SERPL-MCNC: 7.9 MG/DL (ref 8.5–10.1)
CHLORIDE SERPL-SCNC: 109 MMOL/L (ref 94–109)
CO2 SERPL-SCNC: 28 MMOL/L (ref 20–32)
CREAT SERPL-MCNC: 1.32 MG/DL (ref 0.66–1.25)
DIFFERENTIAL METHOD BLD: NORMAL
EOSINOPHIL # BLD AUTO: 0.4 10E9/L (ref 0–0.7)
EOSINOPHIL NFR BLD AUTO: 6.5 %
ERYTHROCYTE [DISTWIDTH] IN BLOOD BY AUTOMATED COUNT: 12.9 % (ref 10–15)
GFR SERPL CREATININE-BSD FRML MDRD: 62 ML/MIN/{1.73_M2}
GLUCOSE SERPL-MCNC: 95 MG/DL (ref 70–99)
HCT VFR BLD AUTO: 45 % (ref 40–53)
HGB BLD-MCNC: 15 G/DL (ref 13.3–17.7)
IMM GRANULOCYTES # BLD: 0 10E9/L (ref 0–0.4)
IMM GRANULOCYTES NFR BLD: 0.4 %
LYMPHOCYTES # BLD AUTO: 1.2 10E9/L (ref 0.8–5.3)
LYMPHOCYTES NFR BLD AUTO: 21.3 %
MCH RBC QN AUTO: 30.6 PG (ref 26.5–33)
MCHC RBC AUTO-ENTMCNC: 33.3 G/DL (ref 31.5–36.5)
MCV RBC AUTO: 92 FL (ref 78–100)
MONOCYTES # BLD AUTO: 0.5 10E9/L (ref 0–1.3)
MONOCYTES NFR BLD AUTO: 8.7 %
NEUTROPHILS # BLD AUTO: 3.4 10E9/L (ref 1.6–8.3)
NEUTROPHILS NFR BLD AUTO: 62.5 %
NRBC # BLD AUTO: 0 10*3/UL
NRBC BLD AUTO-RTO: 0 /100
PLATELET # BLD AUTO: 243 10E9/L (ref 150–450)
POTASSIUM SERPL-SCNC: 4.5 MMOL/L (ref 3.4–5.3)
RBC # BLD AUTO: 4.9 10E12/L (ref 4.4–5.9)
SODIUM SERPL-SCNC: 140 MMOL/L (ref 133–144)
WBC # BLD AUTO: 5.4 10E9/L (ref 4–11)

## 2019-05-24 PROCEDURE — 25000128 H RX IP 250 OP 636: Performed by: INTERNAL MEDICINE

## 2019-05-24 PROCEDURE — 25800030 ZZH RX IP 258 OP 636: Performed by: INTERNAL MEDICINE

## 2019-05-24 PROCEDURE — 36415 COLL VENOUS BLD VENIPUNCTURE: CPT | Performed by: INTERNAL MEDICINE

## 2019-05-24 PROCEDURE — 12000000 ZZH R&B MED SURG/OB

## 2019-05-24 PROCEDURE — 80048 BASIC METABOLIC PNL TOTAL CA: CPT | Performed by: INTERNAL MEDICINE

## 2019-05-24 PROCEDURE — 99221 1ST HOSP IP/OBS SF/LOW 40: CPT | Performed by: SURGERY

## 2019-05-24 PROCEDURE — 85025 COMPLETE CBC W/AUTO DIFF WBC: CPT | Performed by: INTERNAL MEDICINE

## 2019-05-24 RX ADMIN — SODIUM CHLORIDE: 9 INJECTION, SOLUTION INTRAVENOUS at 08:34

## 2019-05-24 RX ADMIN — VANCOMYCIN HYDROCHLORIDE 1750 MG: 5 INJECTION, POWDER, LYOPHILIZED, FOR SOLUTION INTRAVENOUS at 05:40

## 2019-05-24 RX ADMIN — VANCOMYCIN HYDROCHLORIDE 1750 MG: 5 INJECTION, POWDER, LYOPHILIZED, FOR SOLUTION INTRAVENOUS at 17:42

## 2019-05-24 ASSESSMENT — ACTIVITIES OF DAILY LIVING (ADL)
ADLS_ACUITY_SCORE: 10

## 2019-05-24 NOTE — CONSULTS
[facility]  [facility]     Justice Person MRN# [id number]   Age: 48 year old YOB: 1970     History of the Present Illness:    Justice is admitted with a left groin cellulitis and likely MRSA infection. He has required I & D on several occassions in the past. He denies trauma or injury but noted lump about 5 days ago.  On oral Septra for 3 days without improvement.  He denies drainage or fever.  Has eaten today and requesting sedation with I & D.  Well know by ID for MRSA infections.  On Vanco since admission.      Review Of Systems:  Respiratory: No shortness of breath, dyspnea on exertion, cough, or hemoptysis  Cardiovascular: negative  Gastrointestinal: as above  Genitourinary: negative  All remaining review of systems negative except as stated in HPI.    PMH:  Past Medical History:   Diagnosis Date     Abscess      Cellulitis        PSH:  Past Surgical History:   Procedure Laterality Date     COLONOSCOPY N/A 12/10/2018    Procedure: COLONOSCOPY;  Surgeon: Teagan Mitchell MD;  Location: [facility] GI     IRRIGATION AND DEBRIDEMENT FINGER, COMBINED Left 10/4/2018    Procedure: COMBINED IRRIGATION AND DEBRIDEMENT FINGER;  Left ring finger abscess irrigation and debridement with excisional debridement of nonviable skin and subcutaneous tissues.;  Surgeon: Dilan Muñoz MD;  Location: [facility] OR       Allergies:  Allergies   Allergen Reactions     No Known Drug Allergies        Home Medications:  No current outpatient medications on file.       Social History:  Social History     Tobacco Use     Smoking status: Never Smoker     Smokeless tobacco: Never Used   Substance Use Topics     Alcohol use: No     Frequency: Never     Drug use: No       Family History:  Family History   Problem Relation Age of Onset     Diabetes Maternal Grandmother      Cancer - colorectal No family hx of      Prostate Cancer No family hx of      Colon Cancer No family hx of       Physical Exam:  BP  "127/66 (BP Location: Left arm)   Pulse 65   Temp 97  F (36.1  C) (Axillary)   Resp 20   Ht 1.778 m (5' 10\")   Wt 88.1 kg (194 lb 4.8 oz)   SpO2 98%   BMI 27.88 kg/m      General appearance: healthy, alert and no distress.  Hydration: well hydrated  Skin: left groin with erythematous, indurated, firm swelling about 4 cm x 6 cm left groin.  No fluctuance noted.  Small eschar centrally.      Labs Reviewed:  Lab Results   Component Value Date    WBC 5.4 05/24/2019     Lab Results   Component Value Date    HGB 15.0 05/24/2019     Lab Results   Component Value Date     05/24/2019       Last Basic Metabolic Panel:  Lab Results   Component Value Date     05/24/2019      Lab Results   Component Value Date    POTASSIUM 4.5 05/24/2019     Lab Results   Component Value Date    CHLORIDE 109 05/24/2019     Lab Results   Component Value Date    SINGH 7.9 05/24/2019     Lab Results   Component Value Date    CO2 28 05/24/2019     Lab Results   Component Value Date    BUN 16 05/24/2019     Lab Results   Component Value Date    CR 1.32 05/24/2019     Lab Results   Component Value Date    GLC 95 05/24/2019         Radiology:  Ultrasound:  Phlegmon seen, no drain-able fluid seen.    ASSESSMENT/PLAN:  Left groin cellulitis and abscess in patient with known MRSA infection.  Has eaten.  Plan I & D in OR tomorrow with Dr Jean.  Likely can discharge after per recommendations from ID.  Orders placed.    Lalita Sanchez MD    "

## 2019-05-24 NOTE — CONSULTS
ID consult dictated IMP 1 49 yo male known recurrent MRSA abscesses, another episode L groin , not that fluctuant but indurated    REC The fundamental tx of this problem  is drainage, surgery to see, other benefit is hopefully them available for outpt intervention in future, really not needing IV or hospital, if I and D septra and 1 week septra, vanco while here

## 2019-05-24 NOTE — CONSULTS
Consult Date:  05/24/2019      INFECTIOUS DISEASE CONSULTATION      LOCATION:  Room 507, [facility].       REFERRING PHYSICIAN:  Dr. Garrett.      IMPRESSION:   1.  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  Some cellulitis with no clinical sepsis.  Ultrasound shows usual phlegmon.  No major drainable fluid.   2.  History of recurrent abscess including incisions and drainages in the past with documented MRSA.      RECOMMENDATIONS:   1.  Usual conventional treatment of this problem is a surgical incision and drainage, not really major clinical sepsis.  Do not think he needs extended IV antibiotics.  Clinically improved already on the vancomycin but likely still significant phlegmon in the abscess present.  Would have Surgery see for consideration of drainage.   2.  In addition, as seen from the drainage, ideally, the patient become known to the surgeons and outpatient intervention if needed available for future abscesses.   3.  Going forward, same interventions as previously, which would be Bactrim, topical mupirocin to the area, and surgical drainage if not resolving as is the case on this occasion.   4.  Previous decolonization unsuccessful.  No reason to try again.   5.  If I and D done, home on Bactrim for 7-10 days and mupirocin to the area.  No surgical drainage.  Another day or two of IV vancomycin and see how he does.      HISTORY:  This 48-year-old male is seen in consultation due to a left groin area abscess.  The patient is known to us.  He has had prior recurrent abscesses and skin boils.  These have been drained previously and has had documented MRSA.  He had an attempt at decolonization previous, but it was unsuccessful.  The current abscess started just in the last few days.  He started Bactrim early on  but despite that, some spread around the area occurred and not really resolving.  Could not get into surgery outpatient.  Came to the ER and has now been admitted.  He is on vancomycin, and this rash or redness around the area has resolved.  Not having significant fevers, chills or sweats.      PAST MEDICAL HISTORY:  Recurrent abscesses as above.      ALLERGIES:  NONE.      SOCIAL AND FAMILY HISTORY:  No one else in the family with recurrent abscesses.  No family history of note.      MEDICATIONS:  As listed.      REVIEW OF SYSTEMS:  Minimal pain at the site.  No significant other lesions.  No active major systemic symptoms.      PHYSICAL EXAMINATION:   GENERAL:  The patient appears his stated age.  Does not look toxic or ill.   VITAL SIGNS:  Currently normal, including being afebrile.   HEENT:  No thrush or intraoral lesions.  Pupils reactive.   NECK:  Supple and nontender.   HEART:  Regular rhythm, no murmur.   LUNGS:  Clear bilaterally.   ABDOMEN:  Soft and nontender.   EXTREMITIES:  The groin area has an indurated area.  The redness is receding from the marked area.  No gross fluctuance and no drainage occurring.      LABORATORY DATA:  Ultrasound showed phlegmon that probably explains the poor progress with outpatient oral antibiotics.      Thank you very much for consultation.  I will follow the patient with you.         HENNA TABOR MD             D: 2019   T: 2019   MT: ITZ      Name:     HILARY GUERRA   MRN:      [id number]        Account:       [id number]   :      1970           Consult Date:  2019      Document: [id number]       cc: Grant Osuna PA-C

## 2019-05-24 NOTE — PHARMACY-VANCOMYCIN DOSING SERVICE
Pharmacy Vancomycin Initial Note  Date of Service May 23, 2019  Patient's  1970  48 year old, male    Indication: SSTI, Hx of MRSA    Current estimated CrCl = Estimated Creatinine Clearance: 71.6 mL/min (A) (based on SCr of 1.38 mg/dL (H)).    Creatinine for last 3 days  2019:  4:50 PM Creatinine 1.38 mg/dL    Recent Vancomycin Level(s) for last 3 days  No results found for requested labs within last 72 hours.      Vancomycin IV Administrations (past 72 hours)                   vancomycin (VANCOCIN) 1,750 mg in sodium chloride 0.9 % 500 mL intermittent infusion (mg) 1,750 mg Given 19 1848                Nephrotoxins and other renal medications (From now, onward)    Start     Dose/Rate Route Frequency Ordered Stop    19 0600  vancomycin (VANCOCIN) 1,750 mg in sodium chloride 0.9 % 500 mL intermittent infusion      1,750 mg  over 2 Hours Intravenous EVERY 12 HOURS 19 20319 1751  vancomycin (VANCOCIN) 1,750 mg in sodium chloride 0.9 % 500 mL intermittent infusion      1,750 mg  over 2 Hours Intravenous ONCE 19 1750            Contrast Orders - past 72 hours (72h ago, onward)    None                Plan:  1.  Start vancomycin  1750 mg IV q12h.   2.  Goal Trough Level: 15-20 mg/L   3.  Pharmacy will check trough levels as appropriate in 1-3 Days.    4. Serum creatinine levels will be ordered daily for the first week of therapy and at least twice weekly for subsequent weeks.    5. [place] method utilized to dose vancomycin therapy: Method 1    Hammad Bowers

## 2019-05-24 NOTE — PLAN OF CARE
Orientation: Alert and oriented x4  VSS. 95% on RA.   Tele: N/A. HR 68.   LS: Clear, diminished, denies SOB/CEVALLOS  GI: BS audible/active x4, tolerating PO. Passing gas. No BM this shift. Denies N/V.   : Adequate urine output. Yes  Skin: Wound, blanchable redness Lt groin/hip, no drainage, border marked.  Activity: Independent. Pt resting comfortably between  cares.  Pain: 0/10  Plan: IVF, IV abx, ID consulted, discharge TBD. Continue with current cares

## 2019-05-24 NOTE — H&P
[facility]    History and Physical  Hospitalist       Date of Admission:  5/23/2019  Date of Service (when I saw the patient): 05/23/19    Assessment & Plan   Justice Person is a 48 year old male patient with past medical history of  prior history of MRSA infection of left groin and right thigh,MRSA infection of left fourth finger in October 2018 requiring incision and drainage came to the emergency room today for evaluation for redness and bump in the left groin area.  He stated that he noted a bump in the left groin 5 days ago.  He started the Bactrim 4 days ago without significant improvement of the redness.  He has no fever.  He was started on IV vancomycin in the ER after discussion with Dr. Hernández from infectious disease.  He was admitted for further management.    1.  Left groin cellulitis  He does have prior history of recurrent MRSA infection requiring I&D and IV antibiotic treatment.  His recent admission to this facility was in October 2018 for left fourth finger infection which required incision and drainage and IV vancomycin.  Wound culture grew MRSA and  he was discharged on p.o. Bactrim.  Patient stated that he has standing order for Bactrim to start when he developed cellulitis.  He had worsening symptoms despite taking Bactrim for the last 4 days.  Patient was started on IV vancomycin in the ER.  Will continue vancomycin.  Will also consult ID for further evaluation and recommendations.  At this moment there is no evidence of drainable abscess.  This can be reevaluated in the morning in case he needs I&D.    DVT Prophylaxis: Pneumatic Compression Devices  Code Status: Full Code.  Confirmed with patient    Disposition: Expected discharge: Anticipate 2 nights of hospital course for IV antibiotic treatment    Grant Garrett MD    Primary Care Physician   Yany Hair    Chief Complaint   Left groin swelling and redness    History is obtained from the patient    History of  Present Illness   Justice Person is a 48 year old male patient with past medical history of  prior history of MRSA infection of left groin and right thigh,MRSA infection of left fourth finger in October 2018 requiring incision and drainage came to the emergency room today for evaluation for redness and bump in the left groin area.  He stated that he noted a bump in the left groin 5 days ago.  He developed redness around swelling.  He started that he was seen by Dr. Jimenes in the past and has standing order for Bactrim to start when he develops cellulitis.  He started Bactrim on 5/20/2019.  He stated that he continued to have expansion of the redness despite antibiotic use.  He denies associated fever, nausea, vomiting, abdominal pain.  In the ER his vital signs were checked and showed temperature 98, pulse 59, blood pressure 147/91, oxygen saturation 99% on room air.  Laboratory work-up showed sodium 139, potassium 3.8, creatinine 1.38, WBC 7.5, hemoglobin 15.8.  The ER physician discussed the case with Dr. Hernández from infectious disease and she recommended to start him on IV vancomycin.  Patient was given a dose of vancomycin in the ER.  He was admitted to the hospital for further management.      Past Medical History   I have reviewed this patient's medical history and updated it with pertinent information if needed.   Past Medical History:   Diagnosis Date     Abscess      Cellulitis        Past Surgical History   I have reviewed this patient's surgical history and updated it with pertinent information if needed.  Past Surgical History:   Procedure Laterality Date     COLONOSCOPY N/A 12/10/2018    Procedure: COLONOSCOPY;  Surgeon: Teagan Mitchell MD;  Location: [facility]     IRRIGATION AND DEBRIDEMENT FINGER, COMBINED Left 10/4/2018    Procedure: COMBINED IRRIGATION AND DEBRIDEMENT FINGER;  Left ring finger abscess irrigation and debridement with excisional debridement of nonviable skin and subcutaneous  tissues.;  Surgeon: Dilan Muñoz MD;  Location:  OR       Prior to Admission Medications   Prior to Admission Medications   Prescriptions Last Dose Informant Patient Reported? Taking?   acetaminophen (TYLENOL) 325 MG tablet Past Month at Unknown time  No Yes   Sig: Take 2 tablets (650 mg) by mouth every 4 hours as needed for other (multimodal surgical pain management along with NSAIDS and opioid medication as indicated based on pain control and physical function.)   chlorhexidine (HIBICLENS) 4 % liquid Past Week at Unknown time  No Yes   Sig: Apply topically daily as needed for wound care May use for bathing wounds   sulfamethoxazole-trimethoprim (BACTRIM DS/SEPTRA DS) 800-160 MG per tablet 5/23/2019 at am  No Yes   Sig: Take 1 tablet by mouth 2 times daily      Facility-Administered Medications: None     Allergies   Allergies   Allergen Reactions     No Known Drug Allergies        Social History   I have reviewed this patient's social history and updated it with pertinent information if needed. Justice Morganevelyn  reports that he has never smoked. He has never used smokeless tobacco. He reports that he does not drink alcohol or use drugs.    Family History   I have reviewed this patient's family history and updated it with pertinent information if needed.   Family History   Problem Relation Age of Onset     Diabetes Maternal Grandmother      Cancer - colorectal No family hx of      Prostate Cancer No family hx of      Colon Cancer No family hx of        Review of Systems   The 10 point Review of Systems is negative other than noted in the HPI or here.  Patient had redness and a bump in the left groin area.  Denies associated fever.  No nausea or vomiting.    Physical Exam   Temp: 98  F (36.7  C) Temp src: Oral BP: 128/69 Pulse: 61 Heart Rate: 59 Resp: 14 SpO2: 95 % O2 Device: None (Room air)    Vital Signs with Ranges  Temp:  [98  F (36.7  C)] 98  F (36.7  C)  Pulse:  [61] 61  Heart Rate:  [59] 59  Resp:   [14] 14  BP: (128-147)/(69-91) 128/69  SpO2:  [95 %-99 %] 95 %  190 lbs 0 oz    GEN:  Alert, oriented x 3, appears comfortable, NAD.  HEENT:  Normocephalic/atraumatic, no scleral icterus, no nasal discharge, mouth moist.  CV:  Regular rate and rhythm, no murmur or JVD.  S1 + S2 noted, no S3 or S4.  LUNGS:  Clear to auscultation bilaterally without rales/rhonchi/wheezing/retractions.  Symmetric chest rise on inhalation noted.  ABD:  Active bowel sounds, soft, non-tender/non-distended.  No rebound/guarding/rigidity.  EXT:  No edema or cyanosis.  Hands/feet warm to touch with good signs of peripheral perfusion.  No joint synovitis noted.  SKIN: Bump with area of erythema in the left groin area with no evidence of discharge  NEURO:  Symmetric muscle strength, sensation to touch grossly intact.  No new focal deficits appreciated.    Data   Data reviewed today:  I personally reviewed no images or EKG's today.  Recent Labs   Lab 05/23/19  1650 05/23/19  1644   WBC  --  7.5   HGB  --  15.8   MCV  --  91   PLT  --  263     --    POTASSIUM 3.8  --    CHLORIDE 105  --    CO2 27  --    BUN 22  --    CR 1.38*  --    ANIONGAP 7  --    SINGH 8.6  --    GLC 94  --        Recent Results (from the past 24 hour(s))   US Extremity Non Vascular Left    Narrative    ULTRASOUND EXTREMITY NON VASCULAR LEFT  5/23/2019 5:22 PM     HISTORY: Left groin infection. Question abscess versus cellulitis.    COMPARISON: None.      Impression    IMPRESSION: Focused ultrasound scanning in the area of concern in the  left groin demonstrates a complex nonvascular 1.2 x 1.4 x 0.7 cm  structure with surrounding hyperemia that likely represents a  phlegmon. Drainable fluid is not present. A few regional,  morphological lymph nodes are noted.    ROYAL LOUIE MD

## 2019-05-24 NOTE — PLAN OF CARE
Pt continues to be hospitalized for L groin wound. VSS. Denies pain. Redness from wound within drawn borders. IV fluids discontinued. Adequate PO intake, regular diet. Up independently. Surgery consulted to see. ID following. Discharge possible tomorrow.

## 2019-05-24 NOTE — PROGRESS NOTES
Patient seen and examined, assumed care today, admitted this morning H&P reviewed agreed with the plan as outlined by Dr. Garrett.  Infectious disease consulted input appreciated.  Surgery is consulted, need to evaluate patient for possible I&D.  The lesion is indurated, not fluctuant.  Noted patient has recurrent issues with MRSA skin infection/abscess.  -Patient tolerating oral intake  -Start IV fluid  -Check BMP in the morning, as he has slightly increased creatinine and on Vancomycin.  I discussed with patient and his wife at bedside.

## 2019-05-24 NOTE — PROGRESS NOTES
Infection Prevention:    Patient requires Contact precautions because of MRSA: finger, 2018. Please contact Infection Prevention with any questions/concerns at *[phone].    Sherry Willoughby, ICP

## 2019-05-25 ENCOUNTER — ANESTHESIA (OUTPATIENT)
Dept: SURGERY | Facility: CLINIC | Age: 49
End: 2019-05-25
Payer: COMMERCIAL

## 2019-05-25 ENCOUNTER — ANESTHESIA EVENT (OUTPATIENT)
Dept: SURGERY | Facility: CLINIC | Age: 49
End: 2019-05-25
Payer: COMMERCIAL

## 2019-05-25 LAB
ANION GAP SERPL CALCULATED.3IONS-SCNC: 3 MMOL/L (ref 3–14)
BUN SERPL-MCNC: 14 MG/DL (ref 7–30)
CALCIUM SERPL-MCNC: 7.9 MG/DL (ref 8.5–10.1)
CHLORIDE SERPL-SCNC: 110 MMOL/L (ref 94–109)
CO2 SERPL-SCNC: 27 MMOL/L (ref 20–32)
CREAT SERPL-MCNC: 1.1 MG/DL (ref 0.66–1.25)
GFR SERPL CREATININE-BSD FRML MDRD: 78 ML/MIN/{1.73_M2}
GLUCOSE SERPL-MCNC: 101 MG/DL (ref 70–99)
GRAM STN SPEC: ABNORMAL
GRAM STN SPEC: ABNORMAL
POTASSIUM SERPL-SCNC: 4.2 MMOL/L (ref 3.4–5.3)
SODIUM SERPL-SCNC: 140 MMOL/L (ref 133–144)
SPECIMEN SOURCE: ABNORMAL
VANCOMYCIN SERPL-MCNC: 11.7 MG/L

## 2019-05-25 PROCEDURE — 87075 CULTR BACTERIA EXCEPT BLOOD: CPT | Performed by: SURGERY

## 2019-05-25 PROCEDURE — 10060 I&D ABSCESS SIMPLE/SINGLE: CPT | Performed by: SURGERY

## 2019-05-25 PROCEDURE — 37000008 ZZH ANESTHESIA TECHNICAL FEE, 1ST 30 MIN: Performed by: SURGERY

## 2019-05-25 PROCEDURE — 36000050 ZZH SURGERY LEVEL 2 1ST 30 MIN: Performed by: SURGERY

## 2019-05-25 PROCEDURE — 25800030 ZZH RX IP 258 OP 636: Performed by: ANESTHESIOLOGY

## 2019-05-25 PROCEDURE — 87070 CULTURE OTHR SPECIMN AEROBIC: CPT | Performed by: SURGERY

## 2019-05-25 PROCEDURE — 87205 SMEAR GRAM STAIN: CPT | Performed by: SURGERY

## 2019-05-25 PROCEDURE — 25000128 H RX IP 250 OP 636: Performed by: INTERNAL MEDICINE

## 2019-05-25 PROCEDURE — 25000125 ZZHC RX 250: Performed by: ANESTHESIOLOGY

## 2019-05-25 PROCEDURE — 12000000 ZZH R&B MED SURG/OB

## 2019-05-25 PROCEDURE — 37000009 ZZH ANESTHESIA TECHNICAL FEE, EACH ADDTL 15 MIN: Performed by: SURGERY

## 2019-05-25 PROCEDURE — 25000125 ZZHC RX 250: Performed by: NURSE ANESTHETIST, CERTIFIED REGISTERED

## 2019-05-25 PROCEDURE — 25800030 ZZH RX IP 258 OP 636: Performed by: INTERNAL MEDICINE

## 2019-05-25 PROCEDURE — 40000306 ZZH STATISTIC PRE PROC ASSESS II: Performed by: SURGERY

## 2019-05-25 PROCEDURE — 71000014 ZZH RECOVERY PHASE 1 LEVEL 2 FIRST HR: Performed by: SURGERY

## 2019-05-25 PROCEDURE — 25000128 H RX IP 250 OP 636: Performed by: ANESTHESIOLOGY

## 2019-05-25 PROCEDURE — 87077 CULTURE AEROBIC IDENTIFY: CPT | Performed by: SURGERY

## 2019-05-25 PROCEDURE — 80202 ASSAY OF VANCOMYCIN: CPT | Performed by: INTERNAL MEDICINE

## 2019-05-25 PROCEDURE — 87186 SC STD MICRODIL/AGAR DIL: CPT | Performed by: SURGERY

## 2019-05-25 PROCEDURE — 25800030 ZZH RX IP 258 OP 636: Performed by: SURGERY

## 2019-05-25 PROCEDURE — 25000128 H RX IP 250 OP 636: Performed by: NURSE ANESTHETIST, CERTIFIED REGISTERED

## 2019-05-25 PROCEDURE — 27210794 ZZH OR GENERAL SUPPLY STERILE: Performed by: SURGERY

## 2019-05-25 PROCEDURE — 99238 HOSP IP/OBS DSCHRG MGMT 30/<: CPT | Performed by: INTERNAL MEDICINE

## 2019-05-25 PROCEDURE — 25000128 H RX IP 250 OP 636: Performed by: SURGERY

## 2019-05-25 PROCEDURE — 80048 BASIC METABOLIC PNL TOTAL CA: CPT | Performed by: INTERNAL MEDICINE

## 2019-05-25 PROCEDURE — 36415 COLL VENOUS BLD VENIPUNCTURE: CPT | Performed by: INTERNAL MEDICINE

## 2019-05-25 PROCEDURE — 36000052 ZZH SURGERY LEVEL 2 EA 15 ADDTL MIN: Performed by: SURGERY

## 2019-05-25 RX ORDER — CEFAZOLIN SODIUM 1 G/50ML
1250 SOLUTION INTRAVENOUS EVERY 8 HOURS
Status: DISCONTINUED | OUTPATIENT
Start: 2019-05-25 | End: 2019-05-26 | Stop reason: HOSPADM

## 2019-05-25 RX ORDER — FENTANYL CITRATE 50 UG/ML
INJECTION, SOLUTION INTRAMUSCULAR; INTRAVENOUS PRN
Status: DISCONTINUED | OUTPATIENT
Start: 2019-05-25 | End: 2019-05-25

## 2019-05-25 RX ORDER — GLYCOPYRROLATE 0.2 MG/ML
INJECTION, SOLUTION INTRAMUSCULAR; INTRAVENOUS PRN
Status: DISCONTINUED | OUTPATIENT
Start: 2019-05-25 | End: 2019-05-25

## 2019-05-25 RX ORDER — ONDANSETRON 4 MG/1
4 TABLET, ORALLY DISINTEGRATING ORAL EVERY 30 MIN PRN
Status: DISCONTINUED | OUTPATIENT
Start: 2019-05-25 | End: 2019-05-25 | Stop reason: HOSPADM

## 2019-05-25 RX ORDER — NALOXONE HYDROCHLORIDE 0.4 MG/ML
.1-.4 INJECTION, SOLUTION INTRAMUSCULAR; INTRAVENOUS; SUBCUTANEOUS
Status: DISCONTINUED | OUTPATIENT
Start: 2019-05-25 | End: 2019-05-26 | Stop reason: HOSPADM

## 2019-05-25 RX ORDER — DEXAMETHASONE SODIUM PHOSPHATE 4 MG/ML
INJECTION, SOLUTION INTRA-ARTICULAR; INTRALESIONAL; INTRAMUSCULAR; INTRAVENOUS; SOFT TISSUE PRN
Status: DISCONTINUED | OUTPATIENT
Start: 2019-05-25 | End: 2019-05-25

## 2019-05-25 RX ORDER — FENTANYL CITRATE 50 UG/ML
25-50 INJECTION, SOLUTION INTRAMUSCULAR; INTRAVENOUS
Status: DISCONTINUED | OUTPATIENT
Start: 2019-05-25 | End: 2019-05-25 | Stop reason: HOSPADM

## 2019-05-25 RX ORDER — PROCHLORPERAZINE MALEATE 5 MG
10 TABLET ORAL EVERY 6 HOURS PRN
Status: DISCONTINUED | OUTPATIENT
Start: 2019-05-25 | End: 2019-05-26 | Stop reason: HOSPADM

## 2019-05-25 RX ORDER — LABETALOL 20 MG/4 ML (5 MG/ML) INTRAVENOUS SYRINGE
10
Status: DISCONTINUED | OUTPATIENT
Start: 2019-05-25 | End: 2019-05-25 | Stop reason: HOSPADM

## 2019-05-25 RX ORDER — ACETAMINOPHEN 325 MG/1
975 TABLET ORAL EVERY 8 HOURS
Status: DISCONTINUED | OUTPATIENT
Start: 2019-05-25 | End: 2019-05-26 | Stop reason: HOSPADM

## 2019-05-25 RX ORDER — ONDANSETRON 4 MG/1
4 TABLET, ORALLY DISINTEGRATING ORAL EVERY 6 HOURS PRN
Status: DISCONTINUED | OUTPATIENT
Start: 2019-05-25 | End: 2019-05-26 | Stop reason: HOSPADM

## 2019-05-25 RX ORDER — HYDROMORPHONE HYDROCHLORIDE 1 MG/ML
.3-.5 INJECTION, SOLUTION INTRAMUSCULAR; INTRAVENOUS; SUBCUTANEOUS
Status: DISCONTINUED | OUTPATIENT
Start: 2019-05-25 | End: 2019-05-26 | Stop reason: HOSPADM

## 2019-05-25 RX ORDER — OXYCODONE HYDROCHLORIDE 5 MG/1
5-10 TABLET ORAL
Status: DISCONTINUED | OUTPATIENT
Start: 2019-05-25 | End: 2019-05-26 | Stop reason: HOSPADM

## 2019-05-25 RX ORDER — PROPOFOL 10 MG/ML
INJECTION, EMULSION INTRAVENOUS PRN
Status: DISCONTINUED | OUTPATIENT
Start: 2019-05-25 | End: 2019-05-25

## 2019-05-25 RX ORDER — ONDANSETRON 2 MG/ML
4 INJECTION INTRAMUSCULAR; INTRAVENOUS EVERY 6 HOURS PRN
Status: DISCONTINUED | OUTPATIENT
Start: 2019-05-25 | End: 2019-05-26 | Stop reason: HOSPADM

## 2019-05-25 RX ORDER — ACETAMINOPHEN 325 MG/1
650 TABLET ORAL EVERY 4 HOURS PRN
Status: DISCONTINUED | OUTPATIENT
Start: 2019-05-28 | End: 2019-05-26 | Stop reason: HOSPADM

## 2019-05-25 RX ORDER — LIDOCAINE 40 MG/G
CREAM TOPICAL
Status: DISCONTINUED | OUTPATIENT
Start: 2019-05-25 | End: 2019-05-25 | Stop reason: HOSPADM

## 2019-05-25 RX ORDER — SODIUM CHLORIDE, SODIUM LACTATE, POTASSIUM CHLORIDE, CALCIUM CHLORIDE 600; 310; 30; 20 MG/100ML; MG/100ML; MG/100ML; MG/100ML
INJECTION, SOLUTION INTRAVENOUS CONTINUOUS
Status: DISCONTINUED | OUTPATIENT
Start: 2019-05-25 | End: 2019-05-25 | Stop reason: HOSPADM

## 2019-05-25 RX ORDER — ONDANSETRON 2 MG/ML
4 INJECTION INTRAMUSCULAR; INTRAVENOUS EVERY 30 MIN PRN
Status: DISCONTINUED | OUTPATIENT
Start: 2019-05-25 | End: 2019-05-25 | Stop reason: HOSPADM

## 2019-05-25 RX ORDER — MUPIROCIN 20 MG/G
OINTMENT TOPICAL 2 TIMES DAILY
Qty: 1 G | Refills: 0 | Status: SHIPPED | OUTPATIENT
Start: 2019-05-25 | End: 2019-05-30

## 2019-05-25 RX ORDER — KETAMINE HYDROCHLORIDE 10 MG/ML
INJECTION INTRAMUSCULAR; INTRAVENOUS PRN
Status: DISCONTINUED | OUTPATIENT
Start: 2019-05-25 | End: 2019-05-25

## 2019-05-25 RX ADMIN — FENTANYL CITRATE 50 MCG: 50 INJECTION, SOLUTION INTRAMUSCULAR; INTRAVENOUS at 12:16

## 2019-05-25 RX ADMIN — FENTANYL CITRATE 100 MCG: 50 INJECTION, SOLUTION INTRAMUSCULAR; INTRAVENOUS at 11:03

## 2019-05-25 RX ADMIN — VANCOMYCIN HYDROCHLORIDE 1250 MG: 5 INJECTION, POWDER, LYOPHILIZED, FOR SOLUTION INTRAVENOUS at 16:14

## 2019-05-25 RX ADMIN — SODIUM CHLORIDE, POTASSIUM CHLORIDE, SODIUM LACTATE AND CALCIUM CHLORIDE: 600; 310; 30; 20 INJECTION, SOLUTION INTRAVENOUS at 10:59

## 2019-05-25 RX ADMIN — Medication 30 MG: at 11:10

## 2019-05-25 RX ADMIN — VANCOMYCIN HYDROCHLORIDE 1750 MG: 5 INJECTION, POWDER, LYOPHILIZED, FOR SOLUTION INTRAVENOUS at 06:27

## 2019-05-25 RX ADMIN — DEXAMETHASONE SODIUM PHOSPHATE 4 MG: 4 INJECTION, SOLUTION INTRA-ARTICULAR; INTRALESIONAL; INTRAMUSCULAR; INTRAVENOUS; SOFT TISSUE at 11:03

## 2019-05-25 RX ADMIN — ONDANSETRON 4 MG: 2 INJECTION INTRAMUSCULAR; INTRAVENOUS at 11:03

## 2019-05-25 RX ADMIN — PROPOFOL 200 MG: 10 INJECTION, EMULSION INTRAVENOUS at 11:03

## 2019-05-25 RX ADMIN — GLYCOPYRROLATE 0.2 MG: 0.2 INJECTION, SOLUTION INTRAMUSCULAR; INTRAVENOUS at 11:03

## 2019-05-25 RX ADMIN — LIDOCAINE HYDROCHLORIDE 50 MG: 10 INJECTION, SOLUTION EPIDURAL; INFILTRATION; INTRACAUDAL; PERINEURAL at 11:03

## 2019-05-25 RX ADMIN — MIDAZOLAM 2 MG: 1 INJECTION INTRAMUSCULAR; INTRAVENOUS at 10:59

## 2019-05-25 ASSESSMENT — COPD QUESTIONNAIRES: COPD: 0

## 2019-05-25 ASSESSMENT — LIFESTYLE VARIABLES: TOBACCO_USE: 0

## 2019-05-25 ASSESSMENT — ACTIVITIES OF DAILY LIVING (ADL)
ADLS_ACUITY_SCORE: 10

## 2019-05-25 ASSESSMENT — ENCOUNTER SYMPTOMS
DYSRHYTHMIAS: 0
STRIDOR: 0
SEIZURES: 0

## 2019-05-25 NOTE — PLAN OF CARE
Afebrile. Left groin red and swollen. Surgery planned for tomorrow. Denies pain. Up walking frequently.

## 2019-05-25 NOTE — PHARMACY-VANCOMYCIN DOSING SERVICE
Pharmacy Vancomycin Note  Date of Service May 25, 2019  Patient's  1970   48 year old, male    Indication: Skin and Soft Tissue Infection, Hx of MRSA  Goal Trough Level: 15-20 mg/L  Day of Therapy: 2   Current Vancomycin regimen:  1750 mg IV q12h    Current estimated CrCl = Estimated Creatinine Clearance: 89.8 mL/min (based on SCr of 1.1 mg/dL).    Creatinine for last 3 days  2019:  4:50 PM Creatinine 1.38 mg/dL  2019:  7:05 AM Creatinine 1.32 mg/dL  2019:  4:54 AM Creatinine 1.10 mg/dL    Recent Vancomycin Levels (past 3 days)  2019:  4:54 AM Vancomycin Level 11.7 mg/L    Vancomycin IV Administrations (past 72 hours)                   vancomycin (VANCOCIN) 1,750 mg in sodium chloride 0.9 % 500 mL intermittent infusion (mg) 1,750 mg Given 19 0627     1,750 mg Given 19 1742     1,750 mg Given  0540    vancomycin (VANCOCIN) 1,750 mg in sodium chloride 0.9 % 500 mL intermittent infusion (mg) 1,750 mg Given 19 1848                Nephrotoxins and other renal medications (From now, onward)    Start     Dose/Rate Route Frequency Ordered Stop    19 1600  vancomycin (VANCOCIN) 1,250 mg in sodium chloride 0.9 % 250 mL intermittent infusion      1,250 mg  over 90 Minutes Intravenous EVERY 8 HOURS 19 0839               Contrast Orders - past 72 hours (72h ago, onward)    None          Interpretation of levels and current regimen:  Trough level is  Subtherapeutic    Has serum creatinine changed > 50% in last 72 hours: No    Urine output:  unable to determine    Renal Function: Improving    Plan:  1.  Increase Dose to 1250 mg q8h  2.  Pharmacy will check trough levels as appropriate in 1-3 Days.    3. Serum creatinine levels will be ordered daily for the first week of therapy and at least twice weekly for subsequent weeks.      Tricia Freeman        .

## 2019-05-25 NOTE — DISCHARGE SUMMARY
[facility]  Hospitalist Discharge Summary       Date of Admission:  5/23/2019  Date of Discharge:  5/25/2019  Discharging Provider: Kassandra Steven MD      Discharge Diagnoses     1. L groin abscess with prior hx of MRSA.    Follow-ups Needed After Discharge   Follow-up Appointments     Follow-up and recommended labs and tests       Follow up with primary care provider, Debbie Osuna, within 3 days   for hospital follow- up.  The following labs/tests are recommended: f/u   culture results.             Unresulted Labs Ordered in the Past 30 Days of this Admission     Date and Time Order Name Status Description    5/23/2019 1628 Blood culture Preliminary     5/23/2019 1628 Blood culture Preliminary       These results will be followed up by PCP.    Discharge Disposition   Discharged to home  Condition at discharge: Stable    Hospital Course     Justice Person is a 48 year old male patient with past medical history of  prior history of MRSA infection of left groin and right thigh,MRSA infection of left fourth finger in October 2018 requiring incision and drainage came to the emergency room today for evaluation for redness and bump in the left groin area.  He stated that he noted a bump in the left groin 5 days ago.  He started the Bactrim 4 days ago without significant improvement of the redness.  He has no fever.  He was started on IV vancomycin in the ER after discussion with Dr. Hernández from infectious disease.  He was admitted for further management.     1.  Left groin cellulitis  He does have prior history of recurrent MRSA infection requiring I&D and IV antibiotic treatment.  His recent admission to this facility was in October 2018 for left fourth finger infection which required incision and drainage and IV vancomycin.  Wound culture grew MRSA and  he was discharged on p.o. Bactrim.  Patient stated that he has standing order for Bactrim to start when he developed cellulitis.  He had worsening  symptoms despite taking Bactrim for the last 4 days.  Patient was started on IV vancomycin in the ER.  Will continue vancomycin.     S/p I+D 5/25. Wound cares per surgery. Can discharge with PO bactrim and mupirocin.    Addendum.  - Pt to stay overnight and discharge on 5/26.    Consultations This Hospital Stay   PHARMACY TO DOSE VANCO  PHARMACY TO DOSE VANCO  INFECTIOUS DISEASES IP CONSULT  SURGERY GENERAL IP CONSULT    Code Status   Full Code    Time Spent on this Encounter   I, Kassandra Steven, personally saw the patient today and spent less than or equal to 30 minutes discharging this patient.       Kassandra Steven MD  [facility]  ______________________________________________________________________    Physical Exam   Vital Signs: Temp: 98  F (36.7  C) Temp src: Temporal BP: 133/78   Heart Rate: 53 Resp: 12 SpO2: 99 % O2 Device: None (Room air)    Weight: 194 lbs 4.8 oz    Gen - AAO x 3 in NAD.  Lungs - CTA B.  Heart - RR,S1+S2 nml, no m/g/r.  Abd - soft, NT, + BS, + abscess L groin with fluctuance.  Ext - no edema.       Primary Care Physician   Debbie Osuna    Discharge Orders      Follow-up and recommended labs and tests     Follow up with primary care provider, Debbie Osuna, within 3 days for hospital follow- up.  The following labs/tests are recommended: f/u culture results.     Activity    Your activity upon discharge: activity as tolerated     Wound care and dressings    Instructions to care for your wound at home: as directed.     Full Code     Diet    Follow this diet upon discharge: regular       Significant Results and Procedures   Results for orders placed or performed during the hospital encounter of 05/23/19   US Extremity Non Vascular Left    Narrative    ULTRASOUND EXTREMITY NON VASCULAR LEFT  5/23/2019 5:22 PM     HISTORY: Left groin infection. Question abscess versus cellulitis.    COMPARISON: None.      Impression    IMPRESSION: Focused ultrasound scanning in the  area of concern in the  left groin demonstrates a complex nonvascular 1.2 x 1.4 x 0.7 cm  structure with surrounding hyperemia that likely represents a  phlegmon. Drainable fluid is not present. A few regional,  morphological lymph nodes are noted.    ROYAL LOUIE MD       Discharge Medications   Current Discharge Medication List      CONTINUE these medications which have NOT CHANGED    Details   acetaminophen (TYLENOL) 325 MG tablet Take 2 tablets (650 mg) by mouth every 4 hours as needed for other (multimodal surgical pain management along with NSAIDS and opioid medication as indicated based on pain control and physical function.)  Qty: 100 tablet    Associated Diagnoses: Cellulitis of finger of left hand      chlorhexidine (HIBICLENS) 4 % liquid Apply topically daily as needed for wound care May use for bathing wounds  Qty: 500 mL, Refills: 3    Associated Diagnoses: Cellulitis of right thigh; Cellulitis of left ring finger; Folliculitis      sulfamethoxazole-trimethoprim (BACTRIM DS/SEPTRA DS) 800-160 MG per tablet Take 1 tablet by mouth 2 times daily  Qty: 20 tablet, Refills: 0    Associated Diagnoses: Cellulitis of right thigh; Cellulitis of left ring finger           Allergies   Allergies   Allergen Reactions     No Known Drug Allergies

## 2019-05-25 NOTE — OP NOTE
General Surgery Operative Note    Pre-operative diagnosis: Left Groin abscess   Post-operative diagnosis: same   Procedure: Incision & Debridement of left groin abscess (incisional debridement)   Surgeon: Maury Jean MD   Assistant(s): NONE   Anesthesia: General    Estimated blood loss: 5 cc's   Drains placed: None   Complications:  None   Findings:   Pocket of purulent fluid was identified.  This did not appear to extend out significantly into the surrounding subcutaneous tissue.  Cultures were sent.  The wound was packed with iodoform gauze.     Indications for operation: This is a patient with a known history of MRSA infections.  He presents with a left groin cellulitis and swelling.  Examination revealed findings consistent with an abscess.  Operative incision and debridement was recommended, and the procedure, along with its risks and complications, was discussed with the patient.  He agreed to proceed.    Details of the operation: After informed consent, the patient was taken to the operating where he underwent satisfactory induction of general anesthesia.  The patient was sterilely prepped and draped and an elliptical incision was made over the area of left groin fluctuance.  The skin was excised using a scalpel down into the abscess cavity located within subcutaneous tissue.  Pus was identified and was sent for culture.  The abscess cavity was curetted out and packed with iodoform gauze.  A sterile dressing was then placed.    Patient tolerated the procedure well and was transferred to the recovery room in satisfactory condition.  Sponge and needle counts were correct at the close of the case.    Specimens:   ID Type Source Tests Collected by Time Destination   1 : Left groin abscess Wound Groin ANAEROBIC BACTERIAL CULTURE, GRAM STAIN, WOUND CULTURE AEROBIC BACTERIAL Maury Jean MD 5/25/2019 11:22 AM            Maury Jean MD

## 2019-05-25 NOTE — PROGRESS NOTES
"[facility]  Infectious Disease Progress Note          Assessment and Plan:   IMPRESSION:   1.  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  Some cellulitis with no clinical sepsis.  Ultrasound shows usual phlegmon.  No major drainable fluid.   2.  History of recurrent abscess including incisions and drainages in the past with documented MRSA.      RECOMMENDATIONS:   1.  Usual conventional treatment of this problem is a surgical incision and drainage, not really major clinical sepsis.  Do not think he needs extended IV antibiotics.  Clinically improved already on the vancomycin but likely still significant phlegmon in the abscess present.  Would have Surgery see for consideration of drainage.   2.  In addition, as seen from the drainage, ideally, the patient become known to the surgeons and outpatient intervention if needed available for future abscesses.   3.  Going forward, same interventions as previously, which would be Bactrim, topical mupirocin to the area, and surgical drainage if not resolving as is the case on this occasion.   4.  Previous decolonization unsuccessful.  No reason to try again.   5.  With  I and D done, home on Bactrim for 7-10 days and mupirocin to the area.  BUT just discussed with Dr Jean impressive infection keep in until tomorrow on IV            Interval History:   no new complaints and doing well; no cp, sob, n/v/d, or abd pain in op for I and D. No fver BC neg              Medications:       [Auto Hold] vancomycin (VANCOCIN) IV  1,250 mg Intravenous Q8H                  Physical Exam:   Blood pressure 137/78, pulse 67, temperature 97.4  F (36.3  C), temperature source Temporal, resp. rate (!) 7, height 1.778 m (5' 10\"), weight 88.1 kg (194 lb 4.8 oz), SpO2 100 %.  Wt Readings " from Last 2 Encounters:   05/23/19 88.1 kg (194 lb 4.8 oz)   12/10/18 86.2 kg (190 lb)     Vital Signs with Ranges  Temp:  [95.9  F (35.5  C)-98  F (36.7  C)] 97.4  F (36.3  C)  Pulse:  [67-69] 67  Heart Rate:  [53-73] 73  Resp:  [7-20] 7  BP: (113-137)/(59-80) 137/78  SpO2:  [97 %-100 %] 100 %    Constitutional: Awake, alert, cooperative, no apparent distress   Lungs: Clear to auscultation bilaterally, no crackles or wheezing   Cardiovascular: Regular rate and rhythm, normal S1 and S2, and no murmur noted   Abdomen: Normal bowel sounds, soft, non-distended, non-tender   Skin: No rashes, no cyanosis, no edema   Other:           Data:   All microbiology laboratory data reviewed.  Recent Labs   Lab Test 05/24/19  0705 05/23/19  1644 10/04/18  0624   WBC 5.4 7.5 7.8   HGB 15.0 15.8 15.6   HCT 45.0 47.1 46.5   MCV 92 91 90    263 305     Recent Labs   Lab Test 05/25/19  0454 05/24/19  0705 05/23/19  1650   CR 1.10 1.32* 1.38*     No lab results found.  Recent Labs   Lab Test 05/23/19  1648 05/23/19  1643 10/04/18  1725 10/03/18  1803   CULT No growth after 2 days No growth after 2 days No anaerobes isolated  Light growth  Methicillin resistant Staphylococcus aureus (MRSA)  This isolate DOES NOT demonstrate inducible clindamycin resistance in vitro. Clindamycin   is susceptible and could be used when indicated, however, erythromycin is resistant and   should not be used.  * Moderate growth  Methicillin resistant Staphylococcus aureus (MRSA)  This isolate DOES NOT demonstrate inducible clindamycin resistance in vitro. Clindamycin   is susceptible and could be used when indicated, however, erythromycin is resistant and   should not be used.  *  Critical Value/Significant Value called to and read back by  VICTOR MANUEL MALONE RN ([facility]).  10.05.18 2123 [facility]

## 2019-05-25 NOTE — ANESTHESIA POSTPROCEDURE EVALUATION
Patient: Justice Person    Procedure(s):  Incision and drainage Left groin.    Diagnosis:Groing abscess  Diagnosis Additional Information: Left Groin abscess    Anesthesia Type:  General, LMA    Note:  Anesthesia Post Evaluation    Patient location during evaluation: PACU  Patient participation: Able to fully participate in evaluation  Level of consciousness: awake  Pain management: adequate  Airway patency: patent  Cardiovascular status: acceptable  Respiratory status: acceptable  Hydration status: acceptable  PONV: controlled     Anesthetic complications: None          Last vitals:  Vitals:    05/25/19 1145 05/25/19 1150 05/25/19 1200   BP: 132/78 137/78 134/82   Pulse: 69 67 60   Resp: 12 (!) 7 9   Temp:      SpO2: 97% 100% 100%         Electronically Signed By: Shree Lyons MD  May 25, 2019  12:09 PM

## 2019-05-25 NOTE — PLAN OF CARE
Alert and oriented x4. Up independently. Left groin scabbed, red, warm and swollen. Denies pain. NPO. Plan for I&D in OR at 1330.

## 2019-05-25 NOTE — ANESTHESIA CARE TRANSFER NOTE
Patient: Justice Person    Procedure(s):  Incision and drainage Left groin.    Diagnosis: Groing abscess  Diagnosis Additional Information: No value filed.    Anesthesia Type:   General, LMA     Note:  Airway :LMA  Patient transferred to:PACU  Handoff Report: Identifed the Patient, Identified the Reponsible Provider, Reviewed the pertinent medical history, Discussed the surgical course, Reviewed Intra-OP anesthesia mangement and issues during anesthesia, Set expectations for post-procedure period and Allowed opportunity for questions and acknowledgement of understanding      Vitals: (Last set prior to Anesthesia Care Transfer)    CRNA VITALS  5/25/2019 1109 - 5/25/2019 1140      5/25/2019             NIBP:  123/74    NIBP Mean:  92                Electronically Signed By: PJ Doe CRNA  May 25, 2019  11:40 AM

## 2019-05-25 NOTE — PLAN OF CARE
Pt remains hospitalized for cellulitis of L groin, POD#0 I&D. Pt is A&O x 4, VSS. Pt is on regular diet and up independently. Pt followed by I&D. Possible discharge to home tomorrow.

## 2019-05-25 NOTE — ANESTHESIA PREPROCEDURE EVALUATION
Anesthesia Pre-Procedure Evaluation    Patient: Justice Person   MRN: [id number] : 1970          Preoperative Diagnosis: Groing abscess    Procedure(s):  Incision and drainage Left groin.    Past Medical History:   Diagnosis Date     Abscess      Cellulitis      Past Surgical History:   Procedure Laterality Date     COLONOSCOPY N/A 12/10/2018    Procedure: COLONOSCOPY;  Surgeon: Teagan Mitchell MD;  Location: [facility]     IRRIGATION AND DEBRIDEMENT FINGER, COMBINED Left 10/4/2018    Procedure: COMBINED IRRIGATION AND DEBRIDEMENT FINGER;  Left ring finger abscess irrigation and debridement with excisional debridement of nonviable skin and subcutaneous tissues.;  Surgeon: Dilan Muñoz MD;  Location: [facility]     Anesthesia Evaluation     . Pt has had prior anesthetic. Type: General    No history of anesthetic complications          ROS/MED HX    ENT/Pulmonary:  - neg pulmonary ROS    (-) tobacco use, asthma, COPD, KANCHAN risk factors and recent URI   Neurologic:  - neg neurologic ROS    (-) seizures and CVA   Cardiovascular:  - neg cardiovascular ROS      (-) hypertension, CAD, arrhythmias and valvular problems/murmurs   METS/Exercise Tolerance:     Hematologic:  - neg hematologic  ROS       Musculoskeletal:  - neg musculoskeletal ROS       GI/Hepatic:  - neg GI/hepatic ROS      (-) GERD   Renal/Genitourinary:  - ROS Renal section negative       Endo:  - neg endo ROS    (-) Type I DM, Type II DM, thyroid disease, chronic steroid usage and obesity   Psychiatric:  - neg psychiatric ROS       Infectious Disease:   (+) MRSA,       Malignancy:      - no malignancy   Other:    - neg other ROS                      Physical Exam  Normal systems: cardiovascular, pulmonary and dental    Airway   Mallampati: I  TM distance: >3 FB  Neck ROM: full    Dental     Cardiovascular   Rhythm and rate: regular and normal  (-) no friction rub, no systolic click and no murmur    Pulmonary    breath sounds clear to  "auscultation(-) no rhonchi, no decreased breath sounds, no wheezes, no rales and no stridor            Lab Results   Component Value Date    WBC 5.4 05/24/2019    HGB 15.0 05/24/2019    HCT 45.0 05/24/2019     05/24/2019     05/25/2019    POTASSIUM 4.2 05/25/2019    CHLORIDE 110 (H) 05/25/2019    CO2 27 05/25/2019    BUN 14 05/25/2019    CR 1.10 05/25/2019     (H) 05/25/2019    SINGH 7.9 (L) 05/25/2019    MAG 2.2 10/04/2018    ALBUMIN 4.3 02/25/2009    PROTTOTAL 7.8 02/25/2009    ALT 28 02/25/2009    AST 32 02/25/2009    ALKPHOS 57 02/25/2009    BILITOTAL 0.3 02/25/2009    LIPASE 98 02/25/2009       Preop Vitals  BP Readings from Last 3 Encounters:   05/24/19 113/59   12/10/18 111/73   10/15/18 122/68    Pulse Readings from Last 3 Encounters:   05/23/19 65   10/15/18 78   10/04/18 61      Resp Readings from Last 3 Encounters:   05/24/19 20   12/10/18 14   10/06/18 16    SpO2 Readings from Last 3 Encounters:   05/24/19 98%   12/10/18 98%   10/15/18 99%      Temp Readings from Last 1 Encounters:   05/24/19 97.1  F (36.2  C) (Oral)    Ht Readings from Last 1 Encounters:   05/23/19 1.778 m (5' 10\")      Wt Readings from Last 1 Encounters:   05/23/19 88.1 kg (194 lb 4.8 oz)    Estimated body mass index is 27.88 kg/m  as calculated from the following:    Height as of this encounter: 1.778 m (5' 10\").    Weight as of this encounter: 88.1 kg (194 lb 4.8 oz).       Anesthesia Plan      History & Physical Review  History and physical reviewed and following examination; no interval change.    ASA Status:  2 .    NPO Status:  > 8 hours    Plan for General and LMA with Intravenous induction. Maintenance will be Balanced.    PONV prophylaxis:  Ondansetron (or other 5HT-3) and Dexamethasone or Solumedrol       Postoperative Care  Postoperative pain management:  IV analgesics.      Consents  Anesthetic plan, risks, benefits and alternatives discussed with:  Patient or representative, Patient and Spouse..           "       Shree Lyons MD                    .

## 2019-05-26 VITALS
HEIGHT: 70 IN | RESPIRATION RATE: 14 BRPM | SYSTOLIC BLOOD PRESSURE: 126 MMHG | WEIGHT: 194.3 LBS | TEMPERATURE: 95.8 F | HEART RATE: 60 BPM | DIASTOLIC BLOOD PRESSURE: 73 MMHG | BODY MASS INDEX: 27.82 KG/M2 | OXYGEN SATURATION: 98 %

## 2019-05-26 LAB
CREAT SERPL-MCNC: 1.06 MG/DL (ref 0.66–1.25)
GFR SERPL CREATININE-BSD FRML MDRD: 81 ML/MIN/{1.73_M2}

## 2019-05-26 PROCEDURE — 36415 COLL VENOUS BLD VENIPUNCTURE: CPT | Performed by: SURGERY

## 2019-05-26 PROCEDURE — 82565 ASSAY OF CREATININE: CPT | Performed by: SURGERY

## 2019-05-26 PROCEDURE — 25000128 H RX IP 250 OP 636: Performed by: SURGERY

## 2019-05-26 PROCEDURE — 25800030 ZZH RX IP 258 OP 636: Performed by: SURGERY

## 2019-05-26 RX ORDER — SULFAMETHOXAZOLE/TRIMETHOPRIM 800-160 MG
1 TABLET ORAL 2 TIMES DAILY
Qty: 20 TABLET | Refills: 1 | Status: SHIPPED | OUTPATIENT
Start: 2019-05-26 | End: 2019-05-26

## 2019-05-26 RX ORDER — SULFAMETHOXAZOLE/TRIMETHOPRIM 800-160 MG
1 TABLET ORAL 2 TIMES DAILY
Qty: 28 TABLET | Refills: 1 | Status: SHIPPED | OUTPATIENT
Start: 2019-05-26 | End: 2019-06-09

## 2019-05-26 RX ADMIN — VANCOMYCIN HYDROCHLORIDE 1250 MG: 5 INJECTION, POWDER, LYOPHILIZED, FOR SOLUTION INTRAVENOUS at 08:01

## 2019-05-26 RX ADMIN — VANCOMYCIN HYDROCHLORIDE 1250 MG: 5 INJECTION, POWDER, LYOPHILIZED, FOR SOLUTION INTRAVENOUS at 00:00

## 2019-05-26 ASSESSMENT — ACTIVITIES OF DAILY LIVING (ADL)
ADLS_ACUITY_SCORE: 10

## 2019-05-26 NOTE — PLAN OF CARE
VSS, Afebrile. Denies pain. Small shadow of drainage on surgical dressing, area marked. Up walking in room and sitting up in chair independently. Good appetite for dinner.

## 2019-05-26 NOTE — PROGRESS NOTES
[facility]  Hospitalist Progress Note for 5/26/2019:          Assessment and Plan:   Justice Person is a 48 year old male patient with past medical history of  prior history of MRSA infection of left groin and right thigh,MRSA infection of left fourth finger in October 2018 requiring incision and drainage came to the emergency room today for evaluation for redness and bump in the left groin area.  He stated that he noted a bump in the left groin 5 days ago.  He started the Bactrim 4 days ago without significant improvement of the redness.  He has no fever.  He was started on IV vancomycin in the ER after discussion with Dr. Hernández from infectious disease.  He was admitted for further management.      Left groin cellulitis:  He does have prior history of recurrent MRSA infection requiring I&D and IV antibiotic treatment.  His recent admission to this facility was in October 2018 for left fourth finger infection which required incision and drainage and IV vancomycin.  Wound culture grew MRSA and  he was discharged on p.o. Bactrim.  Patient stated that he has standing order for Bactrim to start when he developed cellulitis.  He had worsening symptoms despite taking Bactrim for the last 4 days.  Patient was admitted & started on IV vancomycin in the ER.  - s/p I & D & being followed by ID & surg & both services have cleared him Irma Person is a 48 year old male patient with past medical history of  prior history of MRSA infection of left groin and right thigh,MRSA infection of left fourth finger in October 2018 requiring incision and drainage came to the emergency room today for evaluation for redness and bump in the left groin area.  He stated that he noted a bump in the left groin 5 days ago.  He started the Bactrim 4 days ago without significant improvement of the redness.  He has no fever.  He was started on IV vancomycin in the ER after discussion with Dr. Hernández from infectious disease.  " He was admitted for further management.     1.  Left groin cellulitis  He does have prior history of recurrent MRSA infection requiring I&D and IV antibiotic treatment.  His recent admission to this facility was in 2018 for left fourth finger infection which required incision and drainage and IV vancomycin.  Wound culture grew MRSA and  he was discharged on p.o. Bactrim.  Patient stated that he has standing order for Bactrim to start when he developed cellulitis.  He had worsening symptoms despite taking Bactrim for the last 4 days.  Patient was started on IV vancomycin in the ER. Started on IV vancomycin. s/p I&D  - per ID & surgery cleared to discharge home to continue po Bactrim x 2 weeks & wound care per surg.         Veronica Dooley MD.  Hospitalist [phone] (7am -6 pm)                 Interval History:   Doing well, pt ready /cleared for discharge by ID & surgery.              Medications:       acetaminophen  975 mg Oral Q8H     vancomycin (VANCOCIN) IV  1,250 mg Intravenous Q8H     [START ON 2019] acetaminophen, acetaminophen, HYDROmorphone, melatonin, naloxone, naloxone, naloxone, ondansetron **OR** ondansetron, ondansetron **OR** ondansetron, oxyCODONE, - MEDICATION INSTRUCTIONS -, prochlorperazine **OR** prochlorperazine               Physical Exam:   Blood pressure 126/73, pulse 60, temperature 95.8  F (35.4  C), temperature source Oral, resp. rate 14, height 1.778 m (5' 10\"), weight 88.1 kg (194 lb 4.8 oz), SpO2 98 %.  Wt Readings from Last 4 Encounters:   19 88.1 kg (194 lb 4.8 oz)   12/10/18 86.2 kg (190 lb)   10/15/18 86.2 kg (190 lb)   10/03/18 88.5 kg (195 lb)         Vital Sign Ranges  Temperature Temp  Av.1  F (36.2  C)  Min: 95.8  F (35.4  C)  Max: 97.9  F (36.6  C)   Blood pressure Systolic (24hrs), Av , Min:115 , Max:144        Diastolic (24hrs), Av, Min:49, Max:96      Pulse Pulse  Av.2  Min: 60  Max: 69   Respirations Resp  Av.2  Min: 7  Max: " 15   Pulse oximetry SpO2  Av.8 %  Min: 95 %  Max: 100 %         Intake/Output Summary (Last 24 hours) at 2019 1130  Last data filed at 2019 0943  Gross per 24 hour   Intake 750 ml   Output --   Net 750 ml       Constitutional: Awake, alert, cooperative, no apparent distress   Lungs: Clear to auscultation bilaterally, no crackles or wheezing   Cardiovascular: Regular rate and rhythm, normal S1 and S2, and no murmur noted   Abdomen: Normal bowel sounds, soft, non-distended, non-tender   Skin: No rashes, no cyanosis, no edema   Neuro:                Data:   All laboratory data reviewed

## 2019-05-26 NOTE — DISCHARGE INSTRUCTIONS
HOME CARE FOLLOWING DEBRIDEMENT  JALIL Faust, RAHEL Gallo R. O Donnell, J. Shaheen  Special instructions for Justice Person:  --Call [phone] to schedule wound check in 1 week.       WOUND CARE:    Dressing changes should be done  two times a day after soaking wound.    Apply over-lay absorbant dry gauze and tape in place.     If you have any questions or concerns about your wound or wound care, or would like further instruction, please contact your surgeon's office.  If you have a complicated wound and have been instructed by a Specialized Wound Care Nurse during your hospitalization, and they have given you contact information to reach them, you may also contact them.    ACTIVITY:  Minimize lifting and stretching of area of debridement and the closest extremity (arm or leg) until further recommended by your surgeon.  If your surgery site is in the abdomen, restrict from overhead reaching and/or stretching.    DIET:  No restrictions.  Increased fluid intake is recommended. While taking pain medications, increase dietary fiber or add a fiber supplementation like Metamucil or Citrucel to help prevent constipation - a possible side effect of pain medications.    DISCOMFORT:  Begin taking pain pills before discomfort is severe.  Take the pain medication with some food, when possible, to minimize side effects.  Intermittent use of ice packs may help.  Expect gradual improvement.    RETURN APPOINTMENT:  As recommended by the surgeon.   Office Phone:  [phone]     CONTACT US IF THE FOLLOWING DEVELOPS:   1. A fever that is above 101     2. If there is a large amount of drainage, bleeding, or swelling.   3. Severe pain that is not relieved by your prescription.   4. Drainage that is thick, cloudy, yellow, green or white.   5. Any other questions not answered by  Frequently Asked Questions  sheet.        FREQUENTLY ASKED QUESTIONS:    Q:  How should my incision look?    A:  Normally your  incision will appear slightly swollen with light redness directly along the incision itself as it heals.  It may feel like a bump or ridge as the healing/scarring happens, and over time (3-4 months) this bump or ridge feeling should slowly go away.  In general, clear or pink watery drainage can be normal at first as your incision heals, but should decrease over time.    Q:  How do I know if my incision is infected?  A:  Look at your incision for signs of infection, like redness around the incision spreading to surrounding skin, or drainage of cloudy or foul-smelling drainage.  If you feel warm, check your temperature to see if you are running a fever.    **If any of these things occur, please notify the nurse at our office.  We may need you to come into the office for an incision check.      Q:  How do I take care of my incision?  A:  If you have a dressing in place - Starting the day after surgery, replace the dressing 1-2 times a day until there is no further drainage from the incision.  At that time, a dressing is no longer needed.  Try to minimize tape on the skin if irritation is occurring at the tape sites.  If you have significant irritation from tape on the skin, please call the office to discuss other method of dressing your incision.    Small pieces of tape called  steri-strips  may be present directly overlying your incision; these may be removed 10 days after surgery unless otherwise specified by your surgeon.  If these tapes start to loosen at the ends, you may trim them back until they fall off or are removed.    A:  If you had  Dermabond  tissue glue used as a dressing (this causes your incision to look shiny with a clear covering over it) - This type of dressing wears off with time and does not require more dressings over the top unless it is draining around the glue as it wears off.  Do not apply ointments or lotions over the incisions until the glue has completely worn off.    Q:  There is a piece  of tape or a sticky  lead  still on my skin.  Can I remove this?  A:  Sometimes the sticky  leads  used for monitoring during surgery or for evaluation in the emergency department are not all removed while you are in the hospital.  These sometimes have a tab or metal dot on them.  You can easily remove these on your own, like taking off a band-aid.  If there is a gel substance under the  lead , simply wipe/clean it off with a washcloth or paper towel.      Q:  What can I do to minimize constipation (very hard stools, or lack of stools)?  A:  Stay well hydrated.  Increase your dietary fiber intake or take a fiber supplement -with plenty of water.  Walk around frequently.  You may consider an over-the-counter stool-softener.  Your Pharmacist can assist you with choosing one that is stocked at your pharmacy.  Constipation is also one of the most common side effects of pain medication.  If you are using pain medication, be pro-active and try to PREVENT problems with constipation by taking the steps above BEFORE constipation becomes a problem.    Q:  What do I do if I need more pain medications?  A:  Call the office to receive refills.  Be aware that certain pain meds cannot be called into a pharmacy and actually require a paper prescription.  A change may be made in your pain med as you progress thru your recovery period or if you have side effects to certain meds.    --Pain meds are NOT refilled after 5pm on weekdays, and NOT AT ALL on the weekends, so please look ahead to prevent problems.      Q:  Why am I having a hard time sleeping now that I am at home?  A:  Many medications you receive while you are in the hospital can impact your sleep for a number of days after your surgery/hospitalization.  Decreased level of activity and naps during the day may also make sleeping at night difficult.  Try to minimize day-time naps, and get up frequently during the day to walk around your home during your recovery time.  Sleep  aides may be of some help, but are not recommended for long-term use.      Q:  I am having some back discomfort.  What should I do?  A:  This may be related to certain positioning that was required for your surgery, extended periods of time in bed, or other changes in your overall activity level.  You may try ice, heat, acetaminophen, or ibuprofen to treat this temporarily.  Note that many pain medications have acetaminophen in them and would state this on the prescription bottle.  Be sure not to exceed the maximum of 4000mg per day of acetaminophen.     **If the pain you are having does not resolve, is severe, or is a flare of back pain you have had on other occasions prior to surgery, please contact your primary physician for further recommendations or for an appointment to be examined at their office.    Q:  Why am I having headaches?  A:  Headaches can be caused by many things:  caffeine withdrawal, use of pain meds, dehydration, high blood pressure, lack of sleep, over-activity/exhaustion, flare-up of usual migraine headaches.  If you feel this is related to muscle tension (a band-like feeling around the head, or a pressure at the low-back of the head) you may try ice or heat to this area.  You may need to drink more fluids (try electrolyte drink like Gatorade), rest, or take your usual migraine medications.   **If your headaches do not resolve, worsen, are accompanied by other symptoms, or if your blood pressure is high, please call your primary physician for recommendation and/or examination.    Q:  I am unable to urinate.  What do I do?  A:  A small percentage of people can have difficulty urinating initially after surgery.  This includes being able to urinate only a very small amount at a time and feeling discomfort or pressure in the very low abdomen.  This is called  urinary retention , and is actually an urgent situation.  Proceed to your nearest Emergency department for evaluation (not an Urgent Care  Center).  Sometimes the bladder does not work correctly after certain medications you receive during surgery, or related to certain procedures.  You may need to have a catheter placed until your bladder recovers.  When planning to go to an Emergency department, it may help to call the ER to let them know you are coming in for this problem after a surgery.  This may help you get in quicker to be evaluated.  **If you have symptoms of a urinary tract infection, please contact your primary physician for the proper evaluation and treatment.          If you have other questions, please call the office Monday thru Friday between 8am and 5pm to discuss with the nurse or physician assistant.  #[phone]    There is a surgeon ON CALL on weekday evenings and over the weekend in case of urgent need only, and may be contacted at the same number.    If you are having an emergency, call 911 or proceed to your nearest emergency department.

## 2019-05-26 NOTE — PROGRESS NOTES
"[facility]   General Surgery Progress Note           Assessment and Plan:   Assessment:   POD#1 s/p Procedure(s):  Incision and drainage Left groin.        Plan:   -ok to discharge today from surgical perspective  -abx per ID  -continue soak wound and place gauze overlay dressing BID  -f/u our clinic for wound check in 1 week.           Interval History:   Afebrile.  Feels well, no c/o.  Not taking narcotics.  Tolerating diet.  Wants to go home.         Physical Exam:   Blood pressure 126/73, pulse 60, temperature 95.8  F (35.4  C), temperature source Oral, resp. rate 14, height 1.778 m (5' 10\"), weight 88.1 kg (194 lb 4.8 oz), SpO2 98 %.    I/O last 3 completed shifts:  In: 500 [I.V.:500]  Out: 5 [Blood:5]    Left groin:   Surrounding erythema pale and minimal, receded from drawn border.  Serosanguinous drainage on packing.  Overlay gauze dressing placed.          Data:     Recent Labs   Lab 05/24/19  0705 05/23/19  1644   HGB 15.0 15.8     Recent Labs   Lab 05/24/19  0705 05/23/19  1644   WBC 5.4 7.5       Annalisa Thornton PA-C       "

## 2019-05-26 NOTE — PROGRESS NOTES
Patient hospitalized for cellulitis of the L groin. Cleared for discharge to home today per MD. Discharge instructions, medications, and follow-ups reviewed with patient and wife in detail. Discharge medications, including mupirocin were filled here, bactrim prescription was sent to patient's pharmacy. Patient and wife verbalized understanding of discharge instructions. Belongings were returned to patient at time of discharge. Wife providing transport home.

## 2019-05-26 NOTE — PLAN OF CARE
POD #1. VSS. Afebrile. LS clear. BS x4. Pt denies pain and nausea. Regular diet. Independently ambulating. PIV - SL. Getting IV Vanco. Groin with shadowing noted on dressing. Contact isolation in place. Slept well. Pt hoping to discharge home today.

## 2019-05-29 LAB
BACTERIA SPEC CULT: ABNORMAL
BACTERIA SPEC CULT: NO GROWTH
BACTERIA SPEC CULT: NO GROWTH
Lab: NORMAL
Lab: NORMAL
SPECIMEN SOURCE: ABNORMAL
SPECIMEN SOURCE: NORMAL
SPECIMEN SOURCE: NORMAL

## 2019-05-30 ENCOUNTER — NURSE TRIAGE (OUTPATIENT)
Dept: NURSING | Facility: CLINIC | Age: 49
End: 2019-05-30

## 2019-05-30 NOTE — TELEPHONE ENCOUNTER
Results of his wound culture, including sensitivity, and note from provider relayed to the patient.  He will follow up at his appointment and continue his antibiotic.    Wanda Sepulveda RN  [facility]           Reason for Disposition    Health Information question, no triage required and triager able to answer question    Protocols used: INFORMATION ONLY CALL-A-AH

## 2019-05-31 ENCOUNTER — TELEPHONE (OUTPATIENT)
Dept: FAMILY MEDICINE | Facility: CLINIC | Age: 49
End: 2019-05-31

## 2019-05-31 NOTE — TELEPHONE ENCOUNTER
Pt was DC'd from [facility] on 5/26/2019 after being treated for Cellulitis Of Groin, Left, Cellulitis Of Left Groin.  Next scheduled appt with PCP is not scheduled.  Please call patient.  Thank you!  Jessica Quigley

## 2019-05-31 NOTE — TELEPHONE ENCOUNTER
ED / Discharge Outreach Protocol    Patient Contact    Attempt # 1    Was call answered?  No.  Left message on voicemail with information to call me back.    Barbara Dahl R.N.

## 2019-06-01 LAB
BACTERIA SPEC CULT: NORMAL
Lab: NORMAL
SPECIMEN SOURCE: NORMAL

## 2019-06-03 NOTE — TELEPHONE ENCOUNTER
"No issues,     Hospital/TCU/ED for chronic condition Discharge Protocol    \"Hi, my name is Barbara Dahl, a registered nurse, and I am calling from [facility].  I am calling to follow up and see how things are going for you after your recent emergency visit/hospital/TCU stay.\"    Tell me how you are doing now that you are home?\" I'm doing really well. I think everything is going as it should.      Discharge Instructions    \"Let's review your discharge instructions.  What is/are the follow-up recommendations?  Pt. Response: I am to follow up with Debbie Osuna PA-C. Otherwise I was on bactrimand I understand reason for that.     \"Has an appointment with your primary care provider been scheduled?\"   No (schedule appointment)    \"When you see the provider, I would recommend that you bring your medications with you.\"    Medications    \"Tell me what changed about your medicines when you discharged?\"    Changes to chronic meds?    0-1    \"What questions do you have about your medications?\"    None     New diagnoses of heart failure, COPD, diabetes, or MI?    No              Medication reconciliation completed? Yes reviewed in Epic  Was MTM referral placed (*Make sure to put transitions as reason for referral)?   No    Call Summary    \"What questions or concerns do you have about your recent visit and your follow-up care?\"     none    \"If you have questions or things don't continue to improve, we encourage you contact us through the main clinic number (give number).  Even if the clinic is not open, triage nurses are available 24/7 to help you.     We would like you to know that our clinic has extended hours (provide information).  We also have urgent care (provide details on closest location and hours/contact info)\"      \"Thank you for your time and take care!\"             "

## 2019-06-12 ENCOUNTER — OFFICE VISIT (OUTPATIENT)
Dept: SURGERY | Facility: CLINIC | Age: 49
End: 2019-06-12
Payer: COMMERCIAL

## 2019-06-12 VITALS
WEIGHT: 194 LBS | SYSTOLIC BLOOD PRESSURE: 110 MMHG | RESPIRATION RATE: 16 BRPM | BODY MASS INDEX: 27.77 KG/M2 | OXYGEN SATURATION: 97 % | DIASTOLIC BLOOD PRESSURE: 68 MMHG | HEIGHT: 70 IN | HEART RATE: 60 BPM

## 2019-06-12 DIAGNOSIS — Z09 SURGICAL FOLLOWUP VISIT: Primary | ICD-10-CM

## 2019-06-12 PROCEDURE — 99024 POSTOP FOLLOW-UP VISIT: CPT | Performed by: PHYSICIAN ASSISTANT

## 2019-06-12 ASSESSMENT — MIFFLIN-ST. JEOR: SCORE: 1746.23

## 2019-06-12 NOTE — PROGRESS NOTES
6/12/2019    [facility] Note     Subjective:  Justice Person is here for his first postoperative visit. He underwent incisional debridement of left groin abscess by Dr. Jean on 5/25/19. Today he  tells me he has been feeling well since discharge from the hospital on 5/26/19. He currently does not require narcotic pain medications, he is eating a normal diet and his bowels are regular. He has completed a 10-day course of Bactrim DS as recommended by the Infectious Disease service.  He reports little/no drainage from the surgical site.  He applies Bacitracin with overlay gauze daily.      Objective:    Left groin wound:  Wound bed is fully healed in and mostly epithelialized.  Wound is moist from Bacitracin, causing difficultly assessing whether there is drainage present.    Assessment:  S/p incisional debridement of left groin abscess.    Plan:  Continue Bacitracin and gauze overlay dressing 3-4 more days, then leave open to air as long as there is no drainage.    RTC PRN      Annalisa Thornton PA-C      Please route or send letter to:  Primary Care Provider (PCP)

## 2019-06-14 ENCOUNTER — OFFICE VISIT (OUTPATIENT)
Dept: FAMILY MEDICINE | Facility: CLINIC | Age: 49
End: 2019-06-14
Payer: COMMERCIAL

## 2019-06-14 VITALS
HEART RATE: 86 BPM | HEIGHT: 70 IN | DIASTOLIC BLOOD PRESSURE: 68 MMHG | BODY MASS INDEX: 27.35 KG/M2 | RESPIRATION RATE: 14 BRPM | SYSTOLIC BLOOD PRESSURE: 102 MMHG | WEIGHT: 191 LBS | OXYGEN SATURATION: 95 %

## 2019-06-14 DIAGNOSIS — A49.02 MRSA INFECTION: ICD-10-CM

## 2019-06-14 DIAGNOSIS — L02.214 GROIN ABSCESS: Primary | ICD-10-CM

## 2019-06-14 PROCEDURE — 99213 OFFICE O/P EST LOW 20 MIN: CPT | Performed by: PHYSICIAN ASSISTANT

## 2019-06-14 ASSESSMENT — MIFFLIN-ST. JEOR: SCORE: 1732.62

## 2019-06-14 ASSESSMENT — PAIN SCALES - GENERAL: PAINLEVEL: NO PAIN (0)

## 2019-06-14 NOTE — PATIENT INSTRUCTIONS
Glad things are trending towards the good.  Follow-up with infectious disease as planned.  Return for preventative health visit [facility]

## 2019-06-14 NOTE — PROGRESS NOTES
Subjective     Justice Person is a 48 year old male who presents to clinic today for the following health issues:    [facility]       Hospital Follow-up Visit:    Hospital/Nursing Home/IP Rehab Facility: [facility]  Date of Admission: 05/23/19  Date of Discharge: 05/26/19  Reason(s) for Admission: 1. L groin abscess with prior hx of MRSA.  4th episode of I&D for recurrent abscess. Previously evaluated by infectious disease and was told this could be recurrent for 6-12 months, but it varies from patient to patient.  Was given script for bactrim and told take it for 4-5 days with any concern of recurrent abscess and then ok to discontinue if helps.   This time started med and despite getting abx it continued to worsen.   Thus, in hospital started on IV abx therapy with vanco and then consulted with gen surg for I&D.     Since hospital discharge he saw gen surg and was told all looked good.   Has next follow-up with Infectious Disease. 6/19 f/u appt  Decolonizes skin at home with hibiclens twice daily and washed everything. Has cut hibiclens back to once per day, but still showers twice per day.   Completed out-pt bactrim at this point.               Problems taking medications regularly:  None       Medication changes since discharge: None       Problems adhering to non-medication therapy:  None    Summary of hospitalization:  [facility] discharge summary reviewed  Diagnostic Tests/Treatments reviewed.  Follow up needed: infectious disease  Other Healthcare Providers Involved in Patient s Care:         gen surg and infectious disease  Update since discharge: improved.     Post Discharge Medication Reconciliation: discharge medications reconciled, continue medications without change.  Plan of care communicated with patient     Coding guidelines for this visit:  Type of Medical   Decision Making Face-to-Face Visit       within 7 Days of discharge Face-to-Face Visit        within 14 days of discharge    Moderate Complexity 10166 11406   High Complexity 46365 67078                Patient Active Problem List   Diagnosis     Upper back pain     CARDIOVASCULAR SCREENING; LDL GOAL LESS THAN 160     Abscess     Cellulitis of left groin     Past Surgical History:   Procedure Laterality Date     COLONOSCOPY N/A 12/10/2018    Procedure: COLONOSCOPY;  Surgeon: Teagan Mitchell MD;  Location: [facility]     INCISION AND DRAINAGE TRUNK, COMBINED Left 5/25/2019    Procedure: Incision and drainage Left groin.;  Surgeon: Maury Jean MD;  Location: [facility] OR     IRRIGATION AND DEBRIDEMENT FINGER, COMBINED Left 10/4/2018    Procedure: COMBINED IRRIGATION AND DEBRIDEMENT FINGER;  Left ring finger abscess irrigation and debridement with excisional debridement of nonviable skin and subcutaneous tissues.;  Surgeon: Dilan Muñoz MD;  Location: [facility] OR       Social History     Tobacco Use     Smoking status: Never Smoker     Smokeless tobacco: Never Used   Substance Use Topics     Alcohol use: No     Frequency: Never     Family History   Problem Relation Age of Onset     Diabetes Maternal Grandmother      Cancer - colorectal No family hx of      Prostate Cancer No family hx of      Colon Cancer No family hx of          Current Outpatient Medications   Medication Sig Dispense Refill     acetaminophen (TYLENOL) 325 MG tablet Take 2 tablets (650 mg) by mouth every 4 hours as needed for other (multimodal surgical pain management along with NSAIDS and opioid medication as indicated based on pain control and physical function.) 100 tablet      chlorhexidine (HIBICLENS) 4 % liquid Apply topically daily as needed for wound care May use for bathing wounds 500 mL 3     Allergies   Allergen Reactions     No Known Drug Allergies          Reviewed and updated as needed this visit by Provider  Tobacco  Allergies  Meds  Problems  Med Hx  Surg Hx  Fam Hx  Soc Hx          Review of Systems   ROS COMP: Constitutional, HEENT,  "cardiovascular, pulmonary, gi and gu, skin systems are negative, except as otherwise noted.      Objective    /68 (BP Location: Right arm, Patient Position: Sitting, Cuff Size: Adult Large)   Pulse 86   Resp 14   Ht 1.778 m (5' 10\")   Wt 86.6 kg (191 lb)   SpO2 95%   BMI 27.41 kg/m    Body mass index is 27.41 kg/m .  Physical Exam   GENERAL: healthy, alert and no distress  SKIN: healing L groin wound with nice pink wound edges. No active drainage, fluctuance, induration or cellulitis.    Diagnostic Test Results:  Labs reviewed in Epic        Assessment & Plan       ICD-10-CM    1. Groin abscess L02.214    2. MRSA infection A49.02    Much improved/stable.  Finished abx therapy and continues to employ recommendations given previously by infectious disease.  Has f/u with them scheduled for next week.   Encouraged to return for routine physical and [facility]  See Patient Instructions  Patient in agreement with plan.     Patient Instructions   Glad things are trending towards the good.  Follow-up with infectious disease as planned.  Return for preventative health visit for fasting [facility].       Return in about 1 month (around 7/14/2019) for Routine Visit, Lab Work, Physical Exam.    Debbie Osuna PA-C  [facility], [place]        "

## 2020-12-15 ENCOUNTER — OFFICE VISIT (OUTPATIENT)
Dept: FAMILY MEDICINE | Facility: CLINIC | Age: 50
End: 2020-12-15
Payer: COMMERCIAL

## 2020-12-15 VITALS
HEIGHT: 70 IN | WEIGHT: 192 LBS | BODY MASS INDEX: 27.49 KG/M2 | DIASTOLIC BLOOD PRESSURE: 78 MMHG | HEART RATE: 64 BPM | SYSTOLIC BLOOD PRESSURE: 112 MMHG | TEMPERATURE: 97.2 F | OXYGEN SATURATION: 97 %

## 2020-12-15 DIAGNOSIS — Z01.818 PREOP GENERAL PHYSICAL EXAM: Primary | ICD-10-CM

## 2020-12-15 DIAGNOSIS — S86.011D RUPTURE OF RIGHT ACHILLES TENDON, SUBSEQUENT ENCOUNTER: ICD-10-CM

## 2020-12-15 LAB
ERYTHROCYTE [DISTWIDTH] IN BLOOD BY AUTOMATED COUNT: 13.2 % (ref 10–15)
HCT VFR BLD AUTO: 49.9 % (ref 40–53)
HGB BLD-MCNC: 17 G/DL (ref 13.3–17.7)
MCH RBC QN AUTO: 30 PG (ref 26.5–33)
MCHC RBC AUTO-ENTMCNC: 34.1 G/DL (ref 31.5–36.5)
MCV RBC AUTO: 88 FL (ref 78–100)
PLATELET # BLD AUTO: 299 10E9/L (ref 150–450)
RBC # BLD AUTO: 5.67 10E12/L (ref 4.4–5.9)
WBC # BLD AUTO: 5.7 10E9/L (ref 4–11)

## 2020-12-15 PROCEDURE — 36415 COLL VENOUS BLD VENIPUNCTURE: CPT | Performed by: FAMILY MEDICINE

## 2020-12-15 PROCEDURE — 85027 COMPLETE CBC AUTOMATED: CPT | Performed by: FAMILY MEDICINE

## 2020-12-15 PROCEDURE — 99214 OFFICE O/P EST MOD 30 MIN: CPT | Performed by: FAMILY MEDICINE

## 2020-12-15 RX ORDER — TESTOSTERONE CYPIONATE 200 MG/ML
INJECTION, SOLUTION INTRAMUSCULAR
COMMUNITY
Start: 2020-10-13

## 2020-12-15 ASSESSMENT — MIFFLIN-ST. JEOR: SCORE: 1727.16

## 2020-12-15 NOTE — LETTER
[facility]  [address] [place] [address]  [phone]                    December 17, 2020    Justice Person  [address] [place] [address]      Dear Justice,    Here is a summary of your recent test results:      -Normal red blood cell (hgb) levels, normal white blood cell count and normal platelet levels.    Your test results are enclosed.      Please contact me if you have any questions.    In addition, here is a list of due or overdue Health Maintenance reminders.    Health Maintenance Due   Topic Date Due     Preventive Care Visit  1970     ANNUAL REVIEW OF HM ORDERS  1970     HIV Screening  10/09/1983     Hepatitis C Screening  10/09/1986     Cholesterol Lab  10/09/2003     Zoster (Shingles) Vaccine (1 of 2) 10/09/2018     PHQ-2  01/01/2020     Flu Vaccine (1) 09/01/2020       Please call us at [phone] (or use [employer]) to address the above recommendations.            Thank you very much for trusting [facility]..     Healthy regards,    Dr. Jus Ugarte, DO           Results for orders placed or performed in visit on 12/15/20   CBC with platelets     Status: None   Result Value Ref Range    WBC 5.7 4.0 - 11.0 10e9/L    RBC Count 5.67 4.4 - 5.9 10e12/L    Hemoglobin 17.0 13.3 - 17.7 g/dL    Hematocrit 49.9 40.0 - 53.0 %    MCV 88 78 - 100 fl    MCH 30.0 26.5 - 33.0 pg    MCHC 34.1 31.5 - 36.5 g/dL    RDW 13.2 10.0 - 15.0 %    Platelet Count 299 150 - 450 10e9/L

## 2020-12-15 NOTE — PATIENT INSTRUCTIONS

## 2020-12-15 NOTE — PROGRESS NOTES
[address] [facility] [address]  Phone: [phone]  Primary Provider: Debbie Osuna  Pre-op Performing Provider: RACHEL LEMON    PREOPERATIVE EVALUATION:  Today's date: 12/15/2020    Justice Person is a 50 year old male who presents for a preoperative evaluation.    Surgical Information:  Surgery/Procedure: Achilles repair   Surgery Location: [facility]  Surgeon: Mary  Surgery Date: 12/17/20  Time of Surgery: 3:330pm  Where patient plans to recover: At home with family  Fax number for surgical facility: [phone]    Type of Anesthesia Anticipated: to be determined    Subjective     HPI related to upcoming procedure: On 12/8/2020, was playing volleyball and felt a jolt in his body. He was then seen at [facility] that morning and diagnosed with full thickness achilles tendon tear. Planning for repair.       Preop Questions 12/15/2020   1. Have you ever had a heart attack or stroke? No   2. Have you ever had surgery on your heart or blood vessels, such as a stent placement, a coronary artery bypass, or surgery on an artery in your head, neck, heart, or legs? No   3. Do you have chest pain with activity? No   4. Do you have a history of  heart failure? No   5. Do you currently have a cold, bronchitis or symptoms of other infection? No   6. Do you have a cough, shortness of breath, or wheezing? No   7. Do you or anyone in your family have previous history of blood clots? No   8. Do you or does anyone in your family have a serious bleeding problem such as prolonged bleeding following surgeries or cuts? No   9. Have you ever had problems with anemia or been told to take iron pills? No   10. Have you had any abnormal blood loss such as black, tarry or bloody stools? No   11. Have you ever had a blood transfusion? No   12. Are you willing to have a blood transfusion if it is medically needed before, during, or after your  surgery? Yes   13. Have you or any of your relatives ever had problems with anesthesia? No   14. Do you have sleep apnea, excessive snoring or daytime drowsiness? No   15. Do you have any artifical heart valves or other implanted medical devices like a pacemaker, defibrillator, or continuous glucose monitor? No   16. Do you have artificial joints? No   17. Are you allergic to latex? No     Health Care Directive:  Patient does not have a Health Care Directive or Living Will: Advance Directive received and scanned. Click on Code in the patient header to view.    Preoperative Review of :    Status of Chronic Conditions:  See problem list for active medical problems.  Problems all longstanding and stable, except as noted/documented.  See ROS for pertinent symptoms related to these conditions.      Review of Systems  CONSTITUTIONAL: NEGATIVE for fever, chills, change in weight  INTEGUMENTARY/SKIN: NEGATIVE for worrisome rashes, moles or lesions  EYES: NEGATIVE for vision changes or irritation  ENT/MOUTH: NEGATIVE for ear, mouth and throat problems  RESP: NEGATIVE for significant cough or SOB  BREAST: NEGATIVE for masses, tenderness or discharge  CV: NEGATIVE for chest pain, palpitations or peripheral edema  GI: NEGATIVE for nausea, abdominal pain, heartburn, or change in bowel habits  : NEGATIVE for frequency, dysuria, or hematuria  MUSCULOSKELETAL: NEGATIVE for significant arthralgias or myalgia  NEURO: NEGATIVE for weakness, dizziness or paresthesias  ENDOCRINE: NEGATIVE for temperature intolerance, skin/hair changes  HEME: NEGATIVE for bleeding problems  PSYCHIATRIC: NEGATIVE for changes in mood or affect    Patient Active Problem List    Diagnosis Date Noted     Cellulitis of left groin 05/23/2019     Priority: Medium     Abscess 10/03/2018     Priority: Medium     CARDIOVASCULAR SCREENING; LDL GOAL LESS THAN 160 10/31/2010     Priority: Medium     Upper back pain 04/07/2009     Priority: Medium      Past  "Medical History:   Diagnosis Date     Abscess      Cellulitis      Past Surgical History:   Procedure Laterality Date     COLONOSCOPY N/A 12/10/2018    Procedure: COLONOSCOPY;  Surgeon: Teagan Mitchell MD;  Location:  GI     INCISION AND DRAINAGE TRUNK, COMBINED Left 5/25/2019    Procedure: Incision and drainage Left groin.;  Surgeon: Maury Jean MD;  Location: [facility] OR     IRRIGATION AND DEBRIDEMENT FINGER, COMBINED Left 10/4/2018    Procedure: COMBINED IRRIGATION AND DEBRIDEMENT FINGER;  Left ring finger abscess irrigation and debridement with excisional debridement of nonviable skin and subcutaneous tissues.;  Surgeon: Dilan Muñoz MD;  Location:  OR     Current Outpatient Medications   Medication Sig Dispense Refill     metroNIDAZOLE (METROCREAM) 0.75 % external cream APPLY THIN LAYER TOPICALLY TWICE A DAY FOR ROSACEA       testosterone cypionate (DEPOTESTOSTERONE) 200 MG/ML injection INJECT 200 MG INTRAMUSCULAR EVERY 2 WEEKS FOR LOW TESTOSTERONE (200MG IS 1ML) DISCARD VIAL AFTER WITHDRAWING A SINGLE DOSE         Allergies   Allergen Reactions     No Known Drug Allergies         Social History     Tobacco Use     Smoking status: Never Smoker     Smokeless tobacco: Never Used   Substance Use Topics     Alcohol use: No     Frequency: Never     Family History   Problem Relation Age of Onset     Diabetes Maternal Grandmother      Cancer - colorectal No family hx of      Prostate Cancer No family hx of      Colon Cancer No family hx of      History   Drug Use No         Objective     /78   Pulse 64   Temp 97.2  F (36.2  C) (Tympanic)   Ht 1.778 m (5' 10\")   Wt 87.1 kg (192 lb)   SpO2 97%   BMI 27.55 kg/m      Physical Exam    GENERAL APPEARANCE: healthy, alert and no distress     EYES: EOMI,  PERRL     HENT: ear canals and TM's normal and nose and mouth without ulcers or lesions     NECK: no adenopathy, no asymmetry, masses, or scars and thyroid normal to palpation     RESP: lungs " clear to auscultation - no rales, rhonchi or wheezes     CV: regular rates and rhythm, normal S1 S2, no S3 or S4 and no murmur, click or rub     ABDOMEN:  soft, nontender, no HSM or masses and bowel sounds normal     MS: extremities normal- no gross deformities noted, no evidence of inflammation in joints, FROM in all extremities.     SKIN: no suspicious lesions or rashes     NEURO: Normal strength and tone, sensory exam grossly normal, mentation intact and speech normal     PSYCH: mentation appears normal. and affect normal/bright     LYMPHATICS: No cervical adenopathy    Recent Labs   Lab Test 05/26/19  0654 05/25/19  0454 05/24/19  0705 05/23/19  1644 05/23/19  1644   HGB  --   --  15.0  --  15.8   PLT  --   --  243  --  263   NA  --  140 140   < >  --    POTASSIUM  --  4.2 4.5   < >  --    CR 1.06 1.10 1.32*   < >  --     < > = values in this interval not displayed.        Diagnostics:  Results for orders placed or performed in visit on 12/15/20   CBC with platelets     Status: None   Result Value Ref Range    WBC 5.7 4.0 - 11.0 10e9/L    RBC Count 5.67 4.4 - 5.9 10e12/L    Hemoglobin 17.0 13.3 - 17.7 g/dL    Hematocrit 49.9 40.0 - 53.0 %    MCV 88 78 - 100 fl    MCH 30.0 26.5 - 33.0 pg    MCHC 34.1 31.5 - 36.5 g/dL    RDW 13.2 10.0 - 15.0 %    Platelet Count 299 150 - 450 10e9/L       No EKG required, no history of coronary heart disease, significant arrhythmia, peripheral arterial disease or other structural heart disease.    Revised Cardiac Risk Index (RCRI):  The patient has the following serious cardiovascular risks for perioperative complications:   - No serious cardiac risks = 0 points     RCRI Interpretation: 0 points: Class I (very low risk - 0.4% complication rate)         Assessment & Plan   The proposed surgical procedure is considered LOW risk.    Preop general physical exam    Rupture of right Achilles tendon, subsequent encounter  - CBC with platelets      Risks and Recommendations:  The patient  has the following additional risks and recommendations for perioperative complications:   - No identified additional risk factors other than previously addressed    Medication Instructions:  Patient is on no chronic medications    RECOMMENDATION:  APPROVAL GIVEN to proceed with proposed procedure, without further diagnostic evaluation.    Signed Electronically by: Jus Ugarte DO    Copy of this evaluation report is provided to requesting physician.    Preop [facility] Preop Guidelines    Revised Cardiac Risk Index

## 2021-04-25 NOTE — ED PROVIDER NOTES
History     Chief Complaint:  Abscess    HPI   Justice Person is a 47 year old male who presents with an abscess. The patient reports that a week ago he was diagnosed with a hemorrhoid and given a steroid cream. He states he has been applying the cream, but noticed an abscess near the hemorrhoid a few days later which has been draining white/yellow draiange. He notes that yesterday he developed yet another abscess, this time in his front groin area, prompting the patient's presentation to the Urgent Care for evaluation. He reports that he had a low grade fever at  and was referred to the ED for further evaluation and possible imaging as MD was concerned that the two abscess may be connected. He is afebrile here and denies having any chills, fever, nausea or vomiting at home. He also denies any recently antibiotic use or history of MRSA.    Allergies:  No known drug allergies     Medications:    ALLEGRA 180 MG PO TABS   hydrocortisone (ANUSOL-HC) 2.5 % cream   NASONEX 50 MCG/ACT NA SUSP     Past Medical History:    Hemorrhoids    Past Surgical History:    History reviewed. No pertinent surgical history.    Family History:    History reviewed. No pertinent family history.     Social History:  Marital Status:   [2]  Smoking status: Never Smoker  Alcohol use: Yes     Review of Systems   Constitutional: Negative for chills and fever.   Gastrointestinal: Negative for nausea and vomiting.        Hemorrhoid and 2 abscesses   All other systems reviewed and are negative.    Physical Exam     Patient Vitals for the past 24 hrs:   BP Temp Temp src Pulse Resp SpO2 Weight   09/17/18 1706 (!) 163/94 98.6  F (37  C) Temporal 83 16 99 % 88 kg (194 lb)       Physical Exam  Constitutional: Alert, attentive, GCS 15.    HEENT: Conjunctiva normal. Mucous membranes moist  CV: regular rate and rhythm; no murmurs, rubs or gallups. Cap refill <2 seconds.  Respiratory: Effort normal. Lungs clear to auscultation bilaterally. No  crackles/rubs/wheezes.  Good air movement.  MSK: Normal range of motion. No peripheral edema or calf tenderness.  Neurological: Alert, attentive.  Skin: Left Suprapubic Region: infected hair follicle with underlying fluctuance.  Surrounding skin with induration and erythema.  Rectum: Large fissure versus fistula adjacent to rectum at 9 o'clock position.  White drainage present. No area of fluctuance. Small area induration and erythema distal to lesion.    Psychiatric: Normal affect.        Emergency Department Course   Procedures:      Procedure: Incision and Drainage   LOCATIONS:  Suprapubic Abscess     ANESTHESIA:  Local field block using Lidocaine 1% plain, total of 1.5 mLs     PREPARATION:  Cleansed with Betadine     PROCEDURE:  Area was incised with #11 Blade with a straight incision.  Wound treatment included Deloculation, Purulent Drainage and Expression of Material.  Packing consisted of Plain Gauze.  Appropriate dressing was applied to cover the area.    Patient Status: Patient tolerated the procedure well. There were no complications.        Emergency Department Course:  Past medical records, nursing notes, and vitals reviewed.  1717: I performed an exam of the patient and obtained history, as documented above.  MD Ramirez in to evaluate patient  1750: I performed an incision and drainage, notes above.    Findings and plan explained to the patient. Patient discharged home with instructions regarding supportive care, medications, and reasons to return. The importance of close follow-up was reviewed.    Impression & Plan    Medical Decision Making:  Justice Person is a 47 year old male who presents from urgent care for evaluation of abscess.  Lesion near rectum appears to be a rectal fissure versus fistula.  There was no abscess amendable to drainage.  Suprapubic abscess appears to stem from folliculitis.  Abscess was drained as noted above.  Patient was instructed to remove packing in 24 hours.  He was  started on Augmentin for mild surrounding cellulitis.  There do not appear at this time to be any complication of cellulitis including necrotizing fascitis, lymphangitis, lymphadenitis, abscess, osteomyelitis, sepsis, or shock.  The patient is not immunosuppressed. He is appropriate for outpatient management.  He was given information to follow-up with colorectal surgery this week for further evaluation.  Otherwise follow-up with PCP in 2 days to ensure no progression and rapid resolution.  Cellulitis precautions for home.  Return to ED with fevers, spreading erythema, vomiting, inability to tolerate antibiotics or any further concerns.  Patient staffed with MD Ramirez.    Diagnosis:    ICD-10-CM    1. Folliculitis L73.9    2. Abscess of groin, left L02.214    3. Cellulitis of groin, left L03.314    4. 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        Reyna Alba APRN CNP  09/18/18 0746     No excercise/No heavy lifting

## (undated) DEVICE — BAG CLEAR TRASH 1.3M 39X33" P4040C

## (undated) DEVICE — GLOVE PROTEXIS POWDER FREE SMT 7.5  2D72PT75X

## (undated) DEVICE — PREP CHLORAPREP 26ML TINTED ORANGE  260815

## (undated) DEVICE — LINEN FULL SHEET 5511

## (undated) DEVICE — PACK MINOR CUSTOM RIDGES SBA32RMRMA

## (undated) DEVICE — SUCTION TIP YANKAUER W/O VENT K86

## (undated) DEVICE — GLOVE PROTEXIS POWDER FREE 8.0 ORTHOPEDIC 2D73ET80

## (undated) DEVICE — PACKING NUGAUZE 1/4" PLAIN 7631

## (undated) DEVICE — TUBING SUCTION 12"X1/4" N612

## (undated) DEVICE — DRSG XEROFORM 1X8"

## (undated) DEVICE — LINEN HALF SHEET 5512

## (undated) DEVICE — ESU PENCIL W/SMOKE EVAC CVPLP2000

## (undated) DEVICE — GLOVE PROTEXIS BLUE W/NEU-THERA 8.0  2D73EB80

## (undated) DEVICE — ESU CORD BIPOLAR GREEN 10-4000

## (undated) DEVICE — GOWN IMPERVIOUS ZONED XLG 9041

## (undated) DEVICE — DRAPE LAP W/ARMBOARD 29410

## (undated) DEVICE — DRSG STERI STRIP 1/2X4" R1547

## (undated) DEVICE — SOL NACL 0.9% IRRIG 1000ML BOTTLE 2F7124

## (undated) DEVICE — DRSG GAUZE 4X4" 8044

## (undated) DEVICE — BNDG KLING 2" 2231

## (undated) DEVICE — KIT ENDO TURNOVER/PROCEDURE W/CLEAN A SCOPE LINERS 103888

## (undated) DEVICE — LINEN TOWEL PACK X10 5473

## (undated) DEVICE — ESU GROUND PAD ADULT W/CORD E7507

## (undated) DEVICE — PACK HAND SOP32HARMO

## (undated) RX ORDER — FENTANYL CITRATE 50 UG/ML
INJECTION, SOLUTION INTRAMUSCULAR; INTRAVENOUS
Status: DISPENSED
Start: 2018-12-10

## (undated) RX ORDER — PROPOFOL 10 MG/ML
INJECTION, EMULSION INTRAVENOUS
Status: DISPENSED
Start: 2018-10-04

## (undated) RX ORDER — FENTANYL CITRATE 50 UG/ML
INJECTION, SOLUTION INTRAMUSCULAR; INTRAVENOUS
Status: DISPENSED
Start: 2019-05-25

## (undated) RX ORDER — FENTANYL CITRATE 50 UG/ML
INJECTION, SOLUTION INTRAMUSCULAR; INTRAVENOUS
Status: DISPENSED
Start: 2018-10-04

## (undated) RX ORDER — KETAMINE HCL IN 0.9 % NACL 50 MG/5 ML
SYRINGE (ML) INTRAVENOUS
Status: DISPENSED
Start: 2019-05-25

## (undated) RX ORDER — LIDOCAINE HYDROCHLORIDE 10 MG/ML
INJECTION, SOLUTION EPIDURAL; INFILTRATION; INTRACAUDAL; PERINEURAL
Status: DISPENSED
Start: 2018-10-03

## (undated) RX ORDER — BUPIVACAINE HYDROCHLORIDE 5 MG/ML
INJECTION, SOLUTION EPIDURAL; INTRACAUDAL
Status: DISPENSED
Start: 2018-10-04